# Patient Record
Sex: FEMALE | Race: WHITE | Employment: STUDENT | URBAN - NONMETROPOLITAN AREA
[De-identification: names, ages, dates, MRNs, and addresses within clinical notes are randomized per-mention and may not be internally consistent; named-entity substitution may affect disease eponyms.]

---
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
## 2024-01-13 NOTE — PROGRESS NOTES
C Section Postpartum Progress Note    Subjective:      24 y.o.   @ 39w4d    Postpartum Day 1:  Delivery for Non reassuraing fetal heart rate tracing    The patient feels well. The patient denies emotional concerns. Pain is well controlled with current medications. The baby iswell. Baby is feeding via breast. Urinary output is adequate. The patient is ambulating well. The patient is tolerating a normal diet. Flatus denies been passed. Objective:      Patient Vitals for the past 8 hrs:   BP Temp Temp src Pulse Resp   22 1608 134/79 98 °F (36.7 °C) Oral 77 17     General:    alert, appears stated age, and cooperative   Bowel Sounds:  active   Lochia:  appropriate   Uterine Fundus:   firm   Incision:  healing well, no significant drainage, no dehiscence, no significant erythema   DVT Evaluation:  No evidence of DVT seen on physical exam.         Assessment:     Status post  section. Doing well postoperatively. Plan: 1. Routine post op care    Continue current care. Mai Apgar.  Dodie Jarquin MBA, Larina Fee  2022 7:39 PM Physical Therapy: Orders received. Chart reviewed and discussed with care team.? Physical Therapy not indicated due to patient ambulating well, independent in room, appropriate for 1 discipline, occupational therapy following patient.? Defer discharge recommendations to occupational therapy.? Will complete orders. Please re-consult if further needs arise.

## 2024-02-02 ENCOUNTER — OFFICE VISIT (OUTPATIENT)
Dept: OBGYN CLINIC | Age: 19
End: 2024-02-02
Payer: COMMERCIAL

## 2024-02-02 VITALS
DIASTOLIC BLOOD PRESSURE: 70 MMHG | WEIGHT: 200 LBS | BODY MASS INDEX: 39.06 KG/M2 | HEART RATE: 97 BPM | SYSTOLIC BLOOD PRESSURE: 114 MMHG

## 2024-02-02 DIAGNOSIS — Z97.5 BREAKTHROUGH BLEEDING ON NEXPLANON: ICD-10-CM

## 2024-02-02 DIAGNOSIS — N94.6 DYSMENORRHEA: Primary | ICD-10-CM

## 2024-02-02 DIAGNOSIS — N92.1 BREAKTHROUGH BLEEDING ON NEXPLANON: ICD-10-CM

## 2024-02-02 PROCEDURE — 99214 OFFICE O/P EST MOD 30 MIN: CPT | Performed by: ADVANCED PRACTICE MIDWIFE

## 2024-02-02 RX ORDER — ETONOGESTREL 68 MG/1
68 IMPLANT SUBCUTANEOUS ONCE
COMMUNITY
Start: 2023-11-24 | End: 2026-11-24

## 2024-02-02 RX ORDER — AMOXICILLIN AND CLAVULANATE POTASSIUM 875; 125 MG/1; MG/1
1 TABLET, FILM COATED ORAL 2 TIMES DAILY
COMMUNITY
Start: 2024-01-31

## 2024-02-02 ASSESSMENT — ENCOUNTER SYMPTOMS
NAUSEA: 0
CONSTIPATION: 0
COUGH: 0
VOMITING: 0
DIARRHEA: 0
ABDOMINAL PAIN: 0
SHORTNESS OF BREATH: 0

## 2024-02-02 NOTE — PROGRESS NOTES
SUBJECTIVE:  Barbi Willson is a 18 y.o. female who presents here today for complaints of:      Chief Complaint   Patient presents with    Vaginal Bleeding     Pt has been having irregular bleeding since getting nexplanon, she has on and off some days but she is for the most part. She has tried taking ibuprofen 800 mg every 8 hours but has not had anymore improvement      Dysmenorrhea, Bleeding with Nexplanon  Utilizing hormonal contraception to relieve uncomfortable menstrual symptoms.    Dysmenorrhea symptoms have been occurring for greater than 1 year.  Nexplanon placed 11/24/23.  Following insertion she did not have any adverse side effects.  Now with breakthrough bleeding that started about 3 weeks ago and is becoming bothersome.  Overall she is still happy with her Nexplanon, but only if the bleeding improves.    Review of Systems   Respiratory:  Negative for cough and shortness of breath.    Gastrointestinal:  Negative for abdominal pain, constipation, diarrhea, nausea and vomiting.   Genitourinary:  Positive for menstrual problem. Negative for difficulty urinating, dysuria, pelvic pain, vaginal bleeding and vaginal discharge.   All other systems reviewed and are negative.    OBJECTIVE:  Vitals:  /70   Pulse 97   Wt 90.7 kg (200 lb)   BMI 39.06 kg/m²     Physical Exam  Appearance:  Normal appearance  Cardiovascular:  Normal rate, Capillary refill less than 2 seconds  Pulmonary:  Normal effort, no distress  Abdominal:  No tenderness  MS:  No Swelling, No dependent edema  Skin:  Warm, dry  Neuro:  Alert and oriented x3, reflexes normal.  Psychiatric:  Normal mood and behavior    ASSESSMENT & PLAN:   Diagnosis Orders   1. Dysmenorrhea        2. Breakthrough bleeding on Nexplanon  norethindrone-ethinyl estradiol-Fe (LO LOESTRIN FE) 1 MG-10 MCG / 10 MCG tablet          Dysmenorrhea  Utilizing hormonal contraception to relieve uncomfortable menstrual symptoms.    Breakthrough Bleeding with 
40.2

## 2024-04-26 ENCOUNTER — PROCEDURE VISIT (OUTPATIENT)
Dept: OBGYN CLINIC | Age: 19
End: 2024-04-26

## 2024-04-26 VITALS
BODY MASS INDEX: 40.44 KG/M2 | HEART RATE: 104 BPM | SYSTOLIC BLOOD PRESSURE: 120 MMHG | WEIGHT: 206 LBS | DIASTOLIC BLOOD PRESSURE: 60 MMHG | HEIGHT: 60 IN

## 2024-04-26 DIAGNOSIS — Z76.89 ENCOUNTER FOR WEIGHT MANAGEMENT: ICD-10-CM

## 2024-04-26 DIAGNOSIS — Z30.46 ENCOUNTER FOR REMOVAL OF SUBDERMAL CONTRACEPTIVE IMPLANT: Primary | ICD-10-CM

## 2024-04-26 DIAGNOSIS — Z86.39 HISTORY OF OBESITY: ICD-10-CM

## 2024-04-26 RX ORDER — PHENTERMINE HYDROCHLORIDE 37.5 MG/1
37.5 TABLET ORAL
Qty: 30 TABLET | Refills: 0 | Status: SHIPPED | OUTPATIENT
Start: 2024-04-26 | End: 2024-05-26

## 2024-04-26 SDOH — ECONOMIC STABILITY: FOOD INSECURITY: WITHIN THE PAST 12 MONTHS, YOU WORRIED THAT YOUR FOOD WOULD RUN OUT BEFORE YOU GOT MONEY TO BUY MORE.: NEVER TRUE

## 2024-04-26 SDOH — ECONOMIC STABILITY: FOOD INSECURITY: WITHIN THE PAST 12 MONTHS, THE FOOD YOU BOUGHT JUST DIDN'T LAST AND YOU DIDN'T HAVE MONEY TO GET MORE.: NEVER TRUE

## 2024-04-26 SDOH — ECONOMIC STABILITY: INCOME INSECURITY: HOW HARD IS IT FOR YOU TO PAY FOR THE VERY BASICS LIKE FOOD, HOUSING, MEDICAL CARE, AND HEATING?: NOT HARD AT ALL

## 2024-04-26 ASSESSMENT — PATIENT HEALTH QUESTIONNAIRE - PHQ9
6. FEELING BAD ABOUT YOURSELF - OR THAT YOU ARE A FAILURE OR HAVE LET YOURSELF OR YOUR FAMILY DOWN: NOT AT ALL
3. TROUBLE FALLING OR STAYING ASLEEP: NOT AT ALL
2. FEELING DOWN, DEPRESSED OR HOPELESS: NOT AT ALL
10. IF YOU CHECKED OFF ANY PROBLEMS, HOW DIFFICULT HAVE THESE PROBLEMS MADE IT FOR YOU TO DO YOUR WORK, TAKE CARE OF THINGS AT HOME, OR GET ALONG WITH OTHER PEOPLE: NOT DIFFICULT AT ALL
4. FEELING TIRED OR HAVING LITTLE ENERGY: NOT AT ALL
SUM OF ALL RESPONSES TO PHQ QUESTIONS 1-9: 1
9. THOUGHTS THAT YOU WOULD BE BETTER OFF DEAD, OR OF HURTING YOURSELF: NOT AT ALL
SUM OF ALL RESPONSES TO PHQ QUESTIONS 1-9: 1
5. POOR APPETITE OR OVEREATING: NOT AT ALL
SUM OF ALL RESPONSES TO PHQ QUESTIONS 1-9: 1
8. MOVING OR SPEAKING SO SLOWLY THAT OTHER PEOPLE COULD HAVE NOTICED. OR THE OPPOSITE, BEING SO FIGETY OR RESTLESS THAT YOU HAVE BEEN MOVING AROUND A LOT MORE THAN USUAL: NOT AT ALL
SUM OF ALL RESPONSES TO PHQ9 QUESTIONS 1 & 2: 0
7. TROUBLE CONCENTRATING ON THINGS, SUCH AS READING THE NEWSPAPER OR WATCHING TELEVISION: SEVERAL DAYS
SUM OF ALL RESPONSES TO PHQ QUESTIONS 1-9: 1
1. LITTLE INTEREST OR PLEASURE IN DOING THINGS: NOT AT ALL

## 2024-04-26 ASSESSMENT — ENCOUNTER SYMPTOMS
NAUSEA: 0
COUGH: 0
CONSTIPATION: 0
ABDOMINAL PAIN: 0
SHORTNESS OF BREATH: 0
VOMITING: 0
DIARRHEA: 0

## 2024-04-26 NOTE — PROGRESS NOTES
Nexplanon Removal Procedure Note    Indications:  [facility]     Pre-operative Diagnosis:  Same    Post-operative Diagnosis: Same    Procedure Details:   Procedure risks, complications, activity restrictions following procedure, possible adverse effects, and warning/danger signs to report immediately were all discussed in detail and patient participation/questions were encouraged.  Following this discussion, informed consent was obtained.      A time out was performed with patient participation.  The patient was then positioned comfortably on the procedure table.  The implant was located and palpated in the left upper arm.  A sterile prep and drape was completed and 1% lidocaine with epinephrine was administered for local anesthetia.  A small incision was made into the skin just beyond the proximal tip of the implant.  Upon visualization of the implant, it was grasped, removed intact, and shown to the patient for visual confirmation of its complete and intact removal.    Condition:  Stable    Complications:  None    Plan:  The patient was advised to call for any fever or for prolonged or severe pain or bleeding. She was advised to use OTC ibuprofen or Tylenol as needed for mild to moderate pain.

## 2024-04-26 NOTE — PROGRESS NOTES
SUBJECTIVE:  Barbi Willson is a 19 y.o. female who presents here today for complaints of:      No chief complaint on file.    Encounter for Weight Loss Counseling, History of Obesity  Reports a history of chronic obesity despite efforts to improve diet, increase exercise, and improve lifestyle.  Requesting to start Adipex.  Past medical history was reviewed and updated as needed  Denies a history of or any known current conditions that would contraindicate the use of Adipex.  Adipex is a controlled substance and has the potential to become addictive.  It should not be taken if you have any history or tendency towards alcohol or drug abuse.  Possible medication side effects include fatigue/insomnia, constipation, anxiousness, palpitations, headache, dry mouth.  If adverse side effects occur, the medication must be discontinued.  Adipex is a stimulant, so it is best to take it in the morning  Discussed the importance of diet (encouraged low carb diet) and exercising at least 30 minutes 4-5x/week.      Review of Systems   Respiratory:  Negative for cough and shortness of breath.    Gastrointestinal:  Negative for abdominal pain, constipation, diarrhea, nausea and vomiting.   Genitourinary:  Negative for difficulty urinating, dysuria, menstrual problem, pelvic pain, vaginal bleeding and vaginal discharge.   All other systems reviewed and are negative.    OBJECTIVE:  Vitals:  /60 (Site: Left Upper Arm)   Pulse (!) 104   Ht 1.524 m (5')   Wt 93.4 kg (206 lb)   BMI 40.23 kg/m²     Physical Exam  Appearance:  Normal appearance  Cardiovascular:  Normal rate, Capillary refill less than 2 seconds  Pulmonary:  Normal effort, no distress  Abdominal:  No tenderness  MS:  No Swelling, No dependent edema  Skin:  Warm, dry  Neuro:  Alert and oriented x3, reflexes normal.  Psychiatric:  Normal mood and behavior    ASSESSMENT & PLAN:   Diagnosis Orders   1. Encounter for removal of subdermal contraceptive implant        2.

## 2024-05-09 ENCOUNTER — HOSPITAL ENCOUNTER (OUTPATIENT)
Dept: CARDIOLOGY | Facility: HOSPITAL | Age: 19
Discharge: HOME | End: 2024-05-09
Payer: MEDICAID

## 2024-05-09 ENCOUNTER — APPOINTMENT (OUTPATIENT)
Dept: RADIOLOGY | Facility: HOSPITAL | Age: 19
End: 2024-05-09
Payer: MEDICAID

## 2024-05-09 ENCOUNTER — HOSPITAL ENCOUNTER (EMERGENCY)
Facility: HOSPITAL | Age: 19
Discharge: HOME | End: 2024-05-09
Attending: EMERGENCY MEDICINE
Payer: MEDICAID

## 2024-05-09 VITALS
SYSTOLIC BLOOD PRESSURE: 115 MMHG | TEMPERATURE: 98.1 F | WEIGHT: 198 LBS | HEART RATE: 84 BPM | DIASTOLIC BLOOD PRESSURE: 75 MMHG | HEIGHT: 64 IN | OXYGEN SATURATION: 99 % | BODY MASS INDEX: 33.8 KG/M2 | RESPIRATION RATE: 19 BRPM

## 2024-05-09 DIAGNOSIS — S06.0X0A CONCUSSION WITHOUT LOSS OF CONSCIOUSNESS, INITIAL ENCOUNTER: ICD-10-CM

## 2024-05-09 DIAGNOSIS — R51.9 FACIAL PAIN: ICD-10-CM

## 2024-05-09 DIAGNOSIS — S09.90XA CLOSED HEAD INJURY, INITIAL ENCOUNTER: Primary | ICD-10-CM
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
## 2024-05-09 LAB
ABO GROUP (TYPE) IN BLOOD: NORMAL
ALBUMIN SERPL BCP-MCNC: 4.9 G/DL (ref 3.4–5)
ALP SERPL-CCNC: 77 U/L (ref 33–110)
ALT SERPL W P-5'-P-CCNC: 13 U/L (ref 7–45)
ANION GAP SERPL CALC-SCNC: 11 MMOL/L (ref 10–20)
ANTIBODY SCREEN: NORMAL
AST SERPL W P-5'-P-CCNC: 14 U/L (ref 9–39)
B-HCG SERPL-ACNC: <2 MIU/ML
BASOPHILS # BLD AUTO: 0.02 X10*3/UL (ref 0–0.1)
BASOPHILS NFR BLD AUTO: 0.3 %
BILIRUB SERPL-MCNC: 1.4 MG/DL (ref 0–1.2)
BUN SERPL-MCNC: 7 MG/DL (ref 6–23)
CALCIUM SERPL-MCNC: 9.7 MG/DL (ref 8.6–10.3)
CHLORIDE SERPL-SCNC: 102 MMOL/L (ref 98–107)
CO2 SERPL-SCNC: 27 MMOL/L (ref 21–32)
CREAT SERPL-MCNC: 0.79 MG/DL (ref 0.5–1.05)
EGFRCR SERPLBLD CKD-EPI 2021: >90 ML/MIN/1.73M*2
EOSINOPHIL # BLD AUTO: 0.06 X10*3/UL (ref 0–0.7)
EOSINOPHIL NFR BLD AUTO: 0.8 %
ERYTHROCYTE [DISTWIDTH] IN BLOOD BY AUTOMATED COUNT: 13 % (ref 11.5–14.5)
GLUCOSE SERPL-MCNC: 95 MG/DL (ref 74–99)
HCT VFR BLD AUTO: 42.6 % (ref 36–46)
HGB BLD-MCNC: 13.9 G/DL (ref 12–16)
IMM GRANULOCYTES # BLD AUTO: 0.02 X10*3/UL (ref 0–0.7)
IMM GRANULOCYTES NFR BLD AUTO: 0.3 % (ref 0–0.9)
LYMPHOCYTES # BLD AUTO: 1.49 X10*3/UL (ref 1.2–4.8)
LYMPHOCYTES NFR BLD AUTO: 19 %
MCH RBC QN AUTO: 27.8 PG (ref 26–34)
MCHC RBC AUTO-ENTMCNC: 32.6 G/DL (ref 32–36)
MCV RBC AUTO: 85 FL (ref 80–100)
MONOCYTES # BLD AUTO: 0.66 X10*3/UL (ref 0.1–1)
MONOCYTES NFR BLD AUTO: 8.4 %
NEUTROPHILS # BLD AUTO: 5.58 X10*3/UL (ref 1.2–7.7)
NEUTROPHILS NFR BLD AUTO: 71.2 %
NRBC BLD-RTO: 0 /100 WBCS (ref 0–0)
PLATELET # BLD AUTO: 319 X10*3/UL (ref 150–450)
POTASSIUM SERPL-SCNC: 3.4 MMOL/L (ref 3.5–5.3)
PROT SERPL-MCNC: 7.5 G/DL (ref 6.4–8.2)
RBC # BLD AUTO: 5 X10*6/UL (ref 4–5.2)
RH FACTOR (ANTIGEN D): NORMAL
SODIUM SERPL-SCNC: 137 MMOL/L (ref 136–145)
WBC # BLD AUTO: 7.8 X10*3/UL (ref 4.4–11.3)

## 2024-05-09 PROCEDURE — 99285 EMERGENCY DEPT VISIT HI MDM: CPT | Mod: 25

## 2024-05-09 PROCEDURE — 86901 BLOOD TYPING SEROLOGIC RH(D): CPT | Performed by: EMERGENCY MEDICINE

## 2024-05-09 PROCEDURE — 76376 3D RENDER W/INTRP POSTPROCES: CPT | Performed by: RADIOLOGY

## 2024-05-09 PROCEDURE — 70450 CT HEAD/BRAIN W/O DYE: CPT | Performed by: RADIOLOGY

## 2024-05-09 PROCEDURE — 70486 CT MAXILLOFACIAL W/O DYE: CPT

## 2024-05-09 PROCEDURE — 76377 3D RENDER W/INTRP POSTPROCES: CPT

## 2024-05-09 PROCEDURE — 70486 CT MAXILLOFACIAL W/O DYE: CPT | Performed by: RADIOLOGY

## 2024-05-09 PROCEDURE — 2500000004 HC RX 250 GENERAL PHARMACY W/ HCPCS (ALT 636 FOR OP/ED): Performed by: EMERGENCY MEDICINE

## 2024-05-09 PROCEDURE — 80053 COMPREHEN METABOLIC PANEL: CPT | Performed by: EMERGENCY MEDICINE

## 2024-05-09 PROCEDURE — 71045 X-RAY EXAM CHEST 1 VIEW: CPT

## 2024-05-09 PROCEDURE — 85025 COMPLETE CBC W/AUTO DIFF WBC: CPT | Performed by: EMERGENCY MEDICINE

## 2024-05-09 PROCEDURE — 84702 CHORIONIC GONADOTROPIN TEST: CPT | Performed by: EMERGENCY MEDICINE

## 2024-05-09 PROCEDURE — 99285 EMERGENCY DEPT VISIT HI MDM: CPT | Performed by: EMERGENCY MEDICINE

## 2024-05-09 PROCEDURE — 93005 ELECTROCARDIOGRAM TRACING: CPT

## 2024-05-09 PROCEDURE — 96375 TX/PRO/DX INJ NEW DRUG ADDON: CPT

## 2024-05-09 PROCEDURE — 96374 THER/PROPH/DIAG INJ IV PUSH: CPT

## 2024-05-09 PROCEDURE — 36415 COLL VENOUS BLD VENIPUNCTURE: CPT | Performed by: EMERGENCY MEDICINE

## 2024-05-09 PROCEDURE — 70450 CT HEAD/BRAIN W/O DYE: CPT

## 2024-05-09 PROCEDURE — 71045 X-RAY EXAM CHEST 1 VIEW: CPT | Performed by: RADIOLOGY

## 2024-05-09 RX ORDER — IBUPROFEN 400 MG/1
400 TABLET ORAL ONCE
Status: DISCONTINUED | OUTPATIENT
Start: 2024-05-09 | End: 2024-05-09

## 2024-05-09 RX ORDER — ONDANSETRON HYDROCHLORIDE 2 MG/ML
4 INJECTION, SOLUTION INTRAVENOUS ONCE
Status: COMPLETED | OUTPATIENT
Start: 2024-05-09 | End: 2024-05-09

## 2024-05-09 RX ORDER — ONDANSETRON 4 MG/1
4 TABLET, ORALLY DISINTEGRATING ORAL EVERY 8 HOURS PRN
Qty: 20 TABLET | Refills: 0 | Status: SHIPPED | OUTPATIENT
Start: 2024-05-09 | End: 2024-05-16

## 2024-05-09 RX ORDER — ACETAMINOPHEN 325 MG/1
975 TABLET ORAL ONCE
Status: COMPLETED | OUTPATIENT
Start: 2024-05-09 | End: 2024-05-09

## 2024-05-09 RX ORDER — KETOROLAC TROMETHAMINE 15 MG/ML
15 INJECTION, SOLUTION INTRAMUSCULAR; INTRAVENOUS ONCE
Status: COMPLETED | OUTPATIENT
Start: 2024-05-09 | End: 2024-05-09

## 2024-05-09 RX ORDER — METOCLOPRAMIDE HYDROCHLORIDE 5 MG/ML
10 INJECTION INTRAMUSCULAR; INTRAVENOUS ONCE
Status: DISCONTINUED | OUTPATIENT
Start: 2024-05-09 | End: 2024-05-09

## 2024-05-09 RX ORDER — POTASSIUM CHLORIDE 1.5 G/1.58G
40 POWDER, FOR SOLUTION ORAL ONCE
Status: DISCONTINUED | OUTPATIENT
Start: 2024-05-09 | End: 2024-05-09

## 2024-05-09 RX ORDER — PROCHLORPERAZINE EDISYLATE 5 MG/ML
10 INJECTION INTRAMUSCULAR; INTRAVENOUS ONCE
Status: DISCONTINUED | OUTPATIENT
Start: 2024-05-09 | End: 2024-05-09

## 2024-05-09 RX ADMIN — ACETAMINOPHEN 975 MG: 325 TABLET ORAL at 14:52

## 2024-05-09 RX ADMIN — KETOROLAC TROMETHAMINE 15 MG: 15 INJECTION, SOLUTION INTRAMUSCULAR; INTRAVENOUS at 15:34

## 2024-05-09 RX ADMIN — ONDANSETRON 4 MG: 2 INJECTION INTRAMUSCULAR; INTRAVENOUS at 14:28

## 2024-05-09 ASSESSMENT — PAIN SCALES - GENERAL
PAINLEVEL_OUTOF10: 6
PAINLEVEL_OUTOF10: 8
PAINLEVEL_OUTOF10: 5 - MODERATE PAIN

## 2024-05-09 ASSESSMENT — COLUMBIA-SUICIDE SEVERITY RATING SCALE - C-SSRS
1. IN THE PAST MONTH, HAVE YOU WISHED YOU WERE DEAD OR WISHED YOU COULD GO TO SLEEP AND NOT WAKE UP?: NO
6. HAVE YOU EVER DONE ANYTHING, STARTED TO DO ANYTHING, OR PREPARED TO DO ANYTHING TO END YOUR LIFE?: NO
2. HAVE YOU ACTUALLY HAD ANY THOUGHTS OF KILLING YOURSELF?: NO

## 2024-05-09 ASSESSMENT — PAIN DESCRIPTION - LOCATION: LOCATION: FACE

## 2024-05-09 ASSESSMENT — LIFESTYLE VARIABLES
EVER FELT BAD OR GUILTY ABOUT YOUR DRINKING: NO
HAVE PEOPLE ANNOYED YOU BY CRITICIZING YOUR DRINKING: NO
TOTAL SCORE: 0
EVER HAD A DRINK FIRST THING IN THE MORNING TO STEADY YOUR NERVES TO GET RID OF A HANGOVER: NO
HAVE YOU EVER FELT YOU SHOULD CUT DOWN ON YOUR DRINKING: NO

## 2024-05-09 ASSESSMENT — PAIN - FUNCTIONAL ASSESSMENT
PAIN_FUNCTIONAL_ASSESSMENT: 0-10

## 2024-05-09 NOTE — Clinical Note
Steph Lewis was seen and treated in our emergency department on 5/9/2024.  She may return to school on 05/13/2024.      If you have any questions or concerns, please don't hesitate to call.      Chu Patel MD DATE OF SERVICE: 01-19-24 @ 12:18    CHIEF COMPLAINT:Patient is a 53y old  Male who presents with a chief complaint of shortness of breath.    HISTORY OF PRESENT ILLNESS: 53-year-old male past medical history of hypertension hypothyroidism non-Hodgkin's lymphoma and testicular cancer in remission melanoma of the scalp status postresection presenting from home after an abnormal nuclear stress test yesterday and Dr. Marks's office.  Reports that he has been having dyspnea on exertion and fatigue for weeks symptoms have been occurring intermittently.  This is what prompted the stress test yesterday.  States that last night he developed intermittent chest pain substernal aching lasting minutes to hours at a time not actively experiencing pain.  EKG done in triage is nonischemic.  Reports that he is here to see Dr. Garrison for cath.  No active chest pain, shortness of breath, leg swelling, syncope, abdominal pain, nausea, vomiting.      PAST MEDICAL & SURGICAL HISTORY:  Testicular Cancer  1994, chemotherapy      Headache, Migraine      HTN (hypertension)      NHL (non-Hodgkin's lymphoma)  1999, Radiation to left neck region      GERD (gastroesophageal reflux disease)      Colitis  surgery 1996      BPH (benign prostatic hyperplasia)      Osteoarthritis  degenerative disc L3-4      History of bone marrow transplant      Hypertriglyceridemia      Malignant melanoma of scalp  RSX 08/18  R neck mass dsx/ LN dsx 10/19/18      Hypothyroidism      History of chemotherapy      History of Raynaud's syndrome      History of orchiectomy  left 1994      S/P colon resection  1996      Lymph node disorder  s/p abdominal lymph node dissection 1994, 1996      Melanoma of scalp or neck  excision 8/18  s/p excision right neck mass & LN dissx      History of nasal septoplasty      History of infusaport central venous catheter insertion      History of infusaport central venous catheter removal              MEDICATIONS:                  FAMILY HISTORY:  Hypertension (Father)        Non-contributory    SOCIAL HISTORY:    [- ] Tobacco former remote smoking  [- ] Drugs  [- ] Alcohol    Allergies    No Known Allergies    Intolerances    	    REVIEW OF SYSTEMS:  CONSTITUTIONAL: No fever  EYES: No eye pain, visual disturbances, or discharge  ENMT:  No difficulty hearing, tinnitus  NECK: No pain or stiffness  RESPIRATORY: No cough, wheezing, + SOB  CARDIOVASCULAR: No chest pain, palpitations, passing out, dizziness, or leg swelling  GASTROINTESTINAL:  No nausea, vomiting, diarrhea or constipation. No melena.  GENITOURINARY: No dysuria, hematuria  NEUROLOGICAL: No stroke like symptoms  SKIN: No burning or lesions   ENDOCRINE: No heat or cold intolerance  MUSCULOSKELETAL: No joint pain or swelling  PSYCHIATRIC: No  anxiety, mood swings  HEME/LYMPH: No bleeding gums  ALLERGY AND IMMUNOLOGIC: No hives or eczema	    All other ROS negative    PHYSICAL EXAM:  T(C): 36.4 (01-19-24 @ 07:42), Max: 36.4 (01-19-24 @ 07:42)  HR: 92 (01-19-24 @ 07:42) (92 - 92)  BP: 102/70 (01-19-24 @ 07:42) (102/70 - 102/70)  RR: 20 (01-19-24 @ 07:42) (20 - 20)  SpO2: 100% (01-19-24 @ 07:42) (100% - 100%)  Wt(kg): --  I&O's Summary      Appearance: Normal	  HEENT:   Normal oral mucosa, EOMI	  Cardiovascular:  S1 S2, No JVD,    Respiratory: Lungs clear to auscultation	  Psychiatry: Alert  Gastrointestinal:  Soft, Non-tender, + BS	  Skin: No rashes   Neurologic: Non-focal  Extremities:  No edema  Vascular: Peripheral pulses palpable    	    	  	  CARDIAC MARKERS:  Labs personally reviewed by me                                  12.3   5.83  )-----------( 202      ( 19 Jan 2024 08:57 )             37.9     01-19    137  |  101  |  14  ----------------------------<  89  4.0   |  27  |  0.85    Ca    9.8      19 Jan 2024 08:57    TPro  6.8  /  Alb  4.2  /  TBili  0.7  /  DBili  x   /  AST  27  /  ALT  15  /  AlkPhos  70  01-19          EKG: Personally reviewed by me - SR with 1st AVB   Radiology: Personally reviewed by me -   < from: Xray Chest 1 View- PORTABLE-Urgent (01.19.24 @ 09:41) >  The heart size is normal.  The lungs are clear.  No pneumothorax or pleural effusion.    IMPRESSION:  Clear lungs.    < end of copied text >  < from: Transthoracic Echocardiogram (06.01.20 @ 17:43) >  Conclusions:  1. Normal left ventricular internal dimensions and wall  thicknesses.  2. Endocardiumnot well visualized; grossly normal left  ventricular systolic function. Regional wall motion could  not be accurately assessed. Consider repeat limited study  with definity contrast if clinically indicated.  3. Normal right ventricular size and function.  4. Estimated right ventricular systolic pressure equals 13  mm Hg, assuming right atrial pressure equals 8 mm Hg,  consistent with normal pulmonary pressures.  *** No previous Echo exam.    < end of copied text >  < from: Cardiac Cath Lab - Adult (08.21.20 @ 07:37) >  CORONARY VESSELS: The coronary circulation is right dominant.  LM:   -- LM: Normal.  LAD:   --  LAD: Normal.  CX:   --  Circumflex: Normal.  RCA:   --  RCA: Normal.    < end of copied text >  < from: CT Neck Soft Tissue w/ IV Cont (11.11.23 @ 16:55) >    At RIGHT level 2 excisional node biopsy site, gas-containing fluid   collection with rim enhancement dissects anteriorly to involve the   submandibular space. In total, collections spans approximately 7 cm max   AP dimension. This is most consistent with ABSCESS, with surrounding   cellulitis and central edema along pharyngeal wall.    At LEFT level 2 site, smaller gas-containing cavity without similar   rim-enhancement to suggest abscess.        Assessment /Plan:     53-year-old male past medical history of hypertension hypothyroidism non-Hodgkin's lymphoma and testicular cancer in remission melanoma of the scalp status postresection presenting from home after an abnormal nuclear stress test yesterday and Dr. Marks's office.  Reports that he has been having dyspnea on exertion and fatigue for weeks symptoms have been occurring intermittently. States that last night he developed intermittent chest pain substernal aching lasting minutes to hours at a time not actively experiencing pain. No active chest pain, shortness of breath, leg swelling, syncope, abdominal pain, nausea, vomiting.     1. Chest Pain  - ECG NSR 1st AVB with no ischemia noted  - Trop negative  - TTE as OP in 12/23 with preserved EF, no WMA and no significant valvular dysfunction  - NST 1/18 abnormal finding moderate to large area of anterolateral ischemia, mod-severe severity.  - Start ASA 81mg PO daily, atorvastatin 40mg PO daily  - Plan for cardiac cath today    2. Hypertension  - BP currently stable off anti-hypertensives    3. HLD  - Check lipid profile  - c/w atorvastatin 40mg PO daily    4. Chronic Pain  - 2/2 immunotherapy side effects  - Resume home pain medication regimen       Differential diagnosis and plan of care discussed with patient after the evaluation. Counseling on diet, nutritional counseling, weight management, exercise and medication compliance was done.   Advanced care planning/advanced directives discussed with patient/family. DNR status including forceful chest compressions to attempt to restart the heart, ventilator support/artificial breathing, electric shock, artificial nutrition, health care proxy, Molst form all discussed with pt. Pt wishes to consider. More than fifteen minutes spent on discussing advanced directives.     Nuvia Ritchie [facility]  Vic Carballo DO [facility]  Cardiovascular Medicine  [address] [place] [address] Dr, [address]  Available for call or text via [employer]  Office [phone]   DATE OF SERVICE: 01-19-24 @ 12:18    CHIEF COMPLAINT:Patient is a 53y old  Male who presents with a chief complaint of shortness of breath.    HISTORY OF PRESENT ILLNESS: 53-year-old male past medical history of hypertension hypothyroidism non-Hodgkin's lymphoma and testicular cancer in remission melanoma of the scalp status postresection presenting from home after an abnormal nuclear stress test yesterday and Dr. Marks's office.  Reports that he has been having dyspnea on exertion and fatigue for weeks symptoms have been occurring intermittently.  This is what prompted the stress test yesterday.  States that last night he developed intermittent chest pain substernal aching lasting minutes to hours at a time not actively experiencing pain.  EKG done in triage is nonischemic.  Reports that he is here to see Dr. Garrison for cath.  No active chest pain, shortness of breath, leg swelling, syncope, abdominal pain, nausea, vomiting.      PAST MEDICAL & SURGICAL HISTORY:  Testicular Cancer  1994, chemotherapy      Headache, Migraine      HTN (hypertension)      NHL (non-Hodgkin's lymphoma)  1999, Radiation to left neck region      GERD (gastroesophageal reflux disease)      Colitis  surgery 1996      BPH (benign prostatic hyperplasia)      Osteoarthritis  degenerative disc L3-4      History of bone marrow transplant      Hypertriglyceridemia      Malignant melanoma of scalp  RSX 08/18  R neck mass dsx/ LN dsx 10/19/18      Hypothyroidism      History of chemotherapy      History of Raynaud's syndrome      History of orchiectomy  left 1994      S/P colon resection  1996      Lymph node disorder  s/p abdominal lymph node dissection 1994, 1996      Melanoma of scalp or neck  excision 8/18  s/p excision right neck mass & LN dissx      History of nasal septoplasty      History of infusaport central venous catheter insertion      History of infusaport central venous catheter removal              MEDICATIONS:                  FAMILY HISTORY:  Hypertension (Father)        Non-contributory    SOCIAL HISTORY:    [- ] Tobacco former remote smoking  [- ] Drugs  [- ] Alcohol    Allergies    No Known Allergies    Intolerances    	    REVIEW OF SYSTEMS:  CONSTITUTIONAL: No fever  EYES: No eye pain, visual disturbances, or discharge  ENMT:  No difficulty hearing, tinnitus  NECK: No pain or stiffness  RESPIRATORY: No cough, wheezing, + SOB  CARDIOVASCULAR: No chest pain, palpitations, passing out, dizziness, or leg swelling  GASTROINTESTINAL:  No nausea, vomiting, diarrhea or constipation. No melena.  GENITOURINARY: No dysuria, hematuria  NEUROLOGICAL: No stroke like symptoms  SKIN: No burning or lesions   ENDOCRINE: No heat or cold intolerance  MUSCULOSKELETAL: No joint pain or swelling  PSYCHIATRIC: No  anxiety, mood swings  HEME/LYMPH: No bleeding gums  ALLERGY AND IMMUNOLOGIC: No hives or eczema	    All other ROS negative    PHYSICAL EXAM:  T(C): 36.4 (01-19-24 @ 07:42), Max: 36.4 (01-19-24 @ 07:42)  HR: 92 (01-19-24 @ 07:42) (92 - 92)  BP: 102/70 (01-19-24 @ 07:42) (102/70 - 102/70)  RR: 20 (01-19-24 @ 07:42) (20 - 20)  SpO2: 100% (01-19-24 @ 07:42) (100% - 100%)  Wt(kg): --  I&O's Summary      Appearance: Normal	  HEENT:   Normal oral mucosa, EOMI	  Cardiovascular:  S1 S2, No JVD,    Respiratory: Lungs clear to auscultation	  Psychiatry: Alert  Gastrointestinal:  Soft, Non-tender, + BS	  Skin: No rashes   Neurologic: Non-focal  Extremities:  No edema  Vascular: Peripheral pulses palpable    	    	  	  CARDIAC MARKERS:  Labs personally reviewed by me                                  12.3   5.83  )-----------( 202      ( 19 Jan 2024 08:57 )             37.9     01-19    137  |  101  |  14  ----------------------------<  89  4.0   |  27  |  0.85    Ca    9.8      19 Jan 2024 08:57    TPro  6.8  /  Alb  4.2  /  TBili  0.7  /  DBili  x   /  AST  27  /  ALT  15  /  AlkPhos  70  01-19          EKG: Personally reviewed by me - SR with 1st AVB   Radiology: Personally reviewed by me -   < from: Xray Chest 1 View- PORTABLE-Urgent (01.19.24 @ 09:41) >  The heart size is normal.  The lungs are clear.  No pneumothorax or pleural effusion.    IMPRESSION:  Clear lungs.    < end of copied text >  < from: Transthoracic Echocardiogram (06.01.20 @ 17:43) >  Conclusions:  1. Normal left ventricular internal dimensions and wall  thicknesses.  2. Endocardiumnot well visualized; grossly normal left  ventricular systolic function. Regional wall motion could  not be accurately assessed. Consider repeat limited study  with definity contrast if clinically indicated.  3. Normal right ventricular size and function.  4. Estimated right ventricular systolic pressure equals 13  mm Hg, assuming right atrial pressure equals 8 mm Hg,  consistent with normal pulmonary pressures.  *** No previous Echo exam.    < end of copied text >  < from: Cardiac Cath Lab - Adult (08.21.20 @ 07:37) >  CORONARY VESSELS: The coronary circulation is right dominant.  LM:   -- LM: Normal.  LAD:   --  LAD: Normal.  CX:   --  Circumflex: Normal.  RCA:   --  RCA: Normal.    < end of copied text >  < from: CT Neck Soft Tissue w/ IV Cont (11.11.23 @ 16:55) >    At RIGHT level 2 excisional node biopsy site, gas-containing fluid   collection with rim enhancement dissects anteriorly to involve the   submandibular space. In total, collections spans approximately 7 cm max   AP dimension. This is most consistent with ABSCESS, with surrounding   cellulitis and central edema along pharyngeal wall.    At LEFT level 2 site, smaller gas-containing cavity without similar   rim-enhancement to suggest abscess.        Assessment /Plan:     53-year-old male past medical history of hypertension hypothyroidism non-Hodgkin's lymphoma and testicular cancer in remission melanoma of the scalp status postresection presenting from home after an abnormal nuclear stress test yesterday and Dr. Marks's office.  Reports that he has been having dyspnea on exertion and fatigue for weeks symptoms have been occurring intermittently. States that last night he developed intermittent chest pain substernal aching lasting minutes to hours at a time not actively experiencing pain. No active chest pain, shortness of breath, leg swelling, syncope, abdominal pain, nausea, vomiting.     1. Chest Pain  - ECG NSR 1st AVB with no ischemia noted  - Trop negative  - TTE as OP in 12/23 with preserved EF, no WMA and no significant valvular dysfunction  - NST 1/18 abnormal finding moderate to large area of anterolateral ischemia, mod-severe severity.  - Start ASA 81mg PO daily, atorvastatin 40mg PO daily  - Plan for cardiac cath today    2. Hypertension  - BP currently stable off anti-hypertensives    3. HLD  - Check lipid profile  - c/w atorvastatin 40mg PO daily    4. Chronic Pain  - 2/2 immunotherapy side effects  - Resume home pain medication regimen           Differential diagnosis and plan of care discussed with patient after the evaluation. Counseling on diet, nutritional counseling, weight management, exercise and medication compliance was done.   Advanced care planning/advanced directives discussed with patient/family. DNR status including forceful chest compressions to attempt to restart the heart, ventilator support/artificial breathing, electric shock, artificial nutrition, health care proxy, Molst form all discussed with pt. Pt wishes to consider. More than fifteen minutes spent on discussing advanced directives.     Nuvia Ritchie [facility]  Vic Carballo DO [facility]  Cardiovascular Medicine  [address] [place] [address] Dr, [address]  Available for call or text via [employer]  Office [phone]
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
## 2024-05-09 NOTE — Clinical Note
Steph Lewis was seen and treated in our emergency department on 5/9/2024.  She may return to school on 05/13/2024.      If you have any questions or concerns, please don't hesitate to call.      Chu Patel MD

## 2024-05-09 NOTE — DISCHARGE INSTRUCTIONS
You likely had a concussion.  You had a CT scan of your head and face that did not show an intracranial hemorrhage or broken bones.  Please take Tylenol and Motrin at home, we will prescribe you Zofran to help with the nausea.
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
## 2024-05-13 LAB
ATRIAL RATE: 89 BPM
P AXIS: 28 DEGREES
P OFFSET: 188 MS
P ONSET: 135 MS
PR INTERVAL: 166 MS
Q ONSET: 218 MS
QRS COUNT: 15 BEATS
QRS DURATION: 96 MS
QT INTERVAL: 374 MS
QTC CALCULATION(BAZETT): 455 MS
QTC FREDERICIA: 426 MS
R AXIS: 57 DEGREES
T AXIS: 27 DEGREES
T OFFSET: 405 MS
VENTRICULAR RATE: 89 BPM

## 2024-05-31 ENCOUNTER — OFFICE VISIT (OUTPATIENT)
Dept: OBGYN CLINIC | Age: 19
End: 2024-05-31
Payer: COMMERCIAL

## 2024-05-31 VITALS
SYSTOLIC BLOOD PRESSURE: 108 MMHG | WEIGHT: 193 LBS | BODY MASS INDEX: 37.89 KG/M2 | HEIGHT: 60 IN | DIASTOLIC BLOOD PRESSURE: 62 MMHG | HEART RATE: 97 BPM

## 2024-05-31 DIAGNOSIS — Z86.39 HISTORY OF OBESITY: ICD-10-CM

## 2024-05-31 DIAGNOSIS — Z76.89 ENCOUNTER FOR WEIGHT MANAGEMENT: Primary | ICD-10-CM

## 2024-05-31 PROCEDURE — 99213 OFFICE O/P EST LOW 20 MIN: CPT | Performed by: ADVANCED PRACTICE MIDWIFE

## 2024-05-31 RX ORDER — PHENTERMINE HYDROCHLORIDE 37.5 MG/1
37.5 TABLET ORAL
Qty: 30 TABLET | Refills: 0 | Status: SHIPPED | OUTPATIENT
Start: 2024-05-31 | End: 2024-06-30

## 2024-05-31 RX ORDER — PHENTERMINE HYDROCHLORIDE 37.5 MG/1
37.5 TABLET ORAL
Qty: 30 TABLET | Refills: 0 | Status: SHIPPED | OUTPATIENT
Start: 2024-07-30 | End: 2024-08-29

## 2024-05-31 RX ORDER — PHENTERMINE HYDROCHLORIDE 37.5 MG/1
37.5 TABLET ORAL
Qty: 30 TABLET | Refills: 0 | Status: SHIPPED | OUTPATIENT
Start: 2024-06-30 | End: 2024-07-30

## 2024-05-31 ASSESSMENT — ENCOUNTER SYMPTOMS
SHORTNESS OF BREATH: 0
ABDOMINAL PAIN: 0
NAUSEA: 0
DIARRHEA: 0
COUGH: 0
CONSTIPATION: 0
VOMITING: 0

## 2024-05-31 NOTE — PROGRESS NOTES
SUBJECTIVE:  Barbi Willson is a 19 y.o. female who presents here today for complaints of:      Chief Complaint   Patient presents with    Weight Management     Adipex refills     Encounter for Weight Loss Counseling, History of Obesity  Taking Adipex as an adjunct to improved diet and increased exercise to promote weight loss.  Using Adipex without complications, taking the medication(s) as directed, and tolerating well.  Past medical history was reviewed and updated as needed.  Denies a history of or any known current conditions that would contraindicate the use of Adipex.      Review of Systems   Respiratory:  Negative for cough and shortness of breath.    Gastrointestinal:  Negative for abdominal pain, constipation, diarrhea, nausea and vomiting.   Genitourinary:  Negative for difficulty urinating, dysuria, menstrual problem, pelvic pain, vaginal bleeding and vaginal discharge.   All other systems reviewed and are negative.    OBJECTIVE:  Vitals:  /62   Pulse 97   Ht 1.524 m (5')   Wt 87.5 kg (193 lb)   BMI 37.69 kg/m²     Wt Readings from Last 3 Encounters:   05/31/24 87.5 kg (193 lb)   04/26/24 93.4 kg (206 lb)   02/02/24 90.7 kg (200 lb) (97 %, Z= 1.94)*     * Growth percentiles are based on CDC (Girls, 2-20 Years) data.     Physical Exam  Appearance:  Normal appearance  Cardiovascular:  Normal rate, Capillary refill less than 2 seconds  Pulmonary:  Normal effort, no distress  Abdominal:  No tenderness  MS:  No Swelling, No dependent edema  Skin:  Warm, dry  Neuro:  Alert and oriented x3, reflexes normal.  Psychiatric:  Normal mood and behavior    ASSESSMENT & PLAN:   Diagnosis Orders   1. Encounter for weight management  phentermine (ADIPEX-P) 37.5 MG tablet    phentermine (ADIPEX-P) 37.5 MG tablet    phentermine (ADIPEX-P) 37.5 MG tablet      2. History of obesity        3. Body mass index (BMI) of 37.0 to 37.9 in adult  phentermine (ADIPEX-P) 37.5 MG tablet    phentermine (ADIPEX-P) 37.5 MG

## 2024-07-20 ENCOUNTER — TELEPHONE (OUTPATIENT)
Dept: OBGYN CLINIC | Age: 19
End: 2024-07-20

## 2024-07-20 DIAGNOSIS — Z34.91 PRENATAL CARE, FIRST TRIMESTER: Primary | ICD-10-CM

## 2024-07-20 DIAGNOSIS — Z34.91 PRENATAL CARE, FIRST TRIMESTER: ICD-10-CM

## 2024-07-20 DIAGNOSIS — N91.2 AMENORRHEA: ICD-10-CM

## 2024-07-20 LAB
ABO + RH BLD: NORMAL
BASOPHILS # BLD: 0 K/UL (ref 0–0.2)
BASOPHILS NFR BLD: 0.2 %
BLD GP AB SCN SERPL QL: NORMAL
EOSINOPHIL # BLD: 0.1 K/UL (ref 0–0.7)
EOSINOPHIL NFR BLD: 1.2 %
ERYTHROCYTE [DISTWIDTH] IN BLOOD BY AUTOMATED COUNT: 13.1 % (ref 11.5–14.5)
GONADOTROPIN, CHORIONIC (HCG) QUANT: 77.5 MIU/ML
HBV SURFACE AG SERPL QL IA: NORMAL
HCT VFR BLD AUTO: 40 % (ref 37–47)
HGB BLD-MCNC: 13.2 G/DL (ref 12–16)
LYMPHOCYTES # BLD: 1.7 K/UL (ref 1–4.8)
LYMPHOCYTES NFR BLD: 25.2 %
MCH RBC QN AUTO: 28.6 PG (ref 27–31.3)
MCHC RBC AUTO-ENTMCNC: 33 % (ref 33–37)
MCV RBC AUTO: 86.8 FL (ref 79.4–94.8)
MONOCYTES # BLD: 0.5 K/UL (ref 0.2–0.8)
MONOCYTES NFR BLD: 8 %
NEUTROPHILS # BLD: 4.3 K/UL (ref 1.4–6.5)
NEUTS SEG NFR BLD: 64.9 %
PLATELET # BLD AUTO: 381 K/UL (ref 130–400)
RBC # BLD AUTO: 4.61 M/UL (ref 4.2–5.4)
RUBELLA ANTIBODY IGG: 118 IU/ML
TSH REFLEX: 1.22 UIU/ML (ref 0.44–3.86)
WBC # BLD AUTO: 6.6 K/UL (ref 4.5–11)

## 2024-07-20 NOTE — TELEPHONE ENCOUNTER
Pt stated she took a pregnancy test night  and it was positive, she is requesting lab work.  Lmp 6/24

## 2024-07-21 LAB
HEPATITIS C ANTIBODY: NONREACTIVE
HIV AG/AB: NONREACTIVE
RPR SER QL: NORMAL

## 2024-07-23 LAB — VZV IGG SER IA-ACNC: 1013 IV

## 2024-07-24 ENCOUNTER — PATIENT MESSAGE (OUTPATIENT)
Dept: OBGYN CLINIC | Age: 19
End: 2024-07-24

## 2024-07-24 DIAGNOSIS — O20.0 THREATENED MISCARRIAGE IN EARLY PREGNANCY: Primary | ICD-10-CM

## 2024-07-25 ENCOUNTER — HOSPITAL ENCOUNTER (OUTPATIENT)
Dept: LAB | Age: 19
Discharge: HOME | End: 2024-07-25
Payer: COMMERCIAL

## 2024-07-25 DIAGNOSIS — O20.0 THREATENED MISCARRIAGE IN EARLY PREGNANCY: ICD-10-CM

## 2024-07-25 LAB — GONADOTROPIN, CHORIONIC (HCG) QUANT: 980.2 MIU/ML

## 2024-07-25 PROCEDURE — 36415 COLL VENOUS BLD VENIPUNCTURE: CPT

## 2024-07-25 PROCEDURE — 84702 CHORIONIC GONADOTROPIN TEST: CPT

## 2024-08-12 ENCOUNTER — OFFICE VISIT (OUTPATIENT)
Dept: OBGYN CLINIC | Age: 19
End: 2024-08-12
Payer: COMMERCIAL

## 2024-08-12 VITALS
HEIGHT: 60 IN | BODY MASS INDEX: 39.27 KG/M2 | SYSTOLIC BLOOD PRESSURE: 110 MMHG | WEIGHT: 200 LBS | DIASTOLIC BLOOD PRESSURE: 68 MMHG | HEART RATE: 76 BPM

## 2024-08-12 DIAGNOSIS — Z72.51 UNPROTECTED SEXUAL INTERCOURSE: ICD-10-CM

## 2024-08-12 DIAGNOSIS — Z3A.01 7 WEEKS GESTATION OF PREGNANCY: ICD-10-CM

## 2024-08-12 DIAGNOSIS — N91.2 AMENORRHEA: ICD-10-CM

## 2024-08-12 DIAGNOSIS — Z36.87 UNSURE OF LMP (LAST MENSTRUAL PERIOD) AS REASON FOR ULTRASOUND SCAN: ICD-10-CM

## 2024-08-12 DIAGNOSIS — N91.2 AMENORRHEA: Primary | ICD-10-CM

## 2024-08-12 DIAGNOSIS — O34.219 PREVIOUS CESAREAN SECTION COMPLICATING PREGNANCY: ICD-10-CM

## 2024-08-12 LAB
AMPHET UR QL SCN: NORMAL
BARBITURATES UR QL SCN: NORMAL
BENZODIAZ UR QL SCN: NORMAL
BILIRUB UR QL STRIP: NEGATIVE
BUPRENORPHINE+NOR UR QL SCN: NORMAL
CANNABINOIDS UR QL SCN: NORMAL
CLARITY UR: CLEAR
COCAINE UR QL SCN: NORMAL
COLOR UR: YELLOW
DRUG SCREEN COMMENT UR-IMP: NORMAL
FENTANYL SCREEN, URINE: NORMAL
GLUCOSE UR STRIP-MCNC: NEGATIVE MG/DL
HGB UR QL STRIP: NEGATIVE
KETONES UR STRIP-MCNC: NEGATIVE MG/DL
LEUKOCYTE ESTERASE UR QL STRIP: NEGATIVE
METHADONE UR QL SCN: NORMAL
NITRITE UR QL STRIP: NEGATIVE
OPIATES UR QL SCN: NORMAL
OXYCODONE UR QL SCN: NORMAL
PCP UR QL SCN: NORMAL
PH UR STRIP: 6 [PH] (ref 5–9)
PROPOXYPH UR QL SCN: NORMAL
PROT UR STRIP-MCNC: NEGATIVE MG/DL
SP GR UR STRIP: 1 (ref 1–1.03)
UROBILINOGEN UR STRIP-ACNC: 0.2 E.U./DL

## 2024-08-12 PROCEDURE — 0500F INITIAL PRENATAL CARE VISIT: CPT | Performed by: OBSTETRICS & GYNECOLOGY

## 2024-08-12 PROCEDURE — 76801 OB US < 14 WKS SINGLE FETUS: CPT | Performed by: OBSTETRICS & GYNECOLOGY

## 2024-08-12 RX ORDER — PROCHLORPERAZINE MALEATE 10 MG
10 TABLET ORAL EVERY 6 HOURS PRN
Qty: 60 TABLET | Refills: 1 | Status: SHIPPED | OUTPATIENT
Start: 2024-08-12

## 2024-08-12 RX ORDER — ONDANSETRON 4 MG/1
4 TABLET, FILM COATED ORAL EVERY 6 HOURS PRN
Qty: 30 TABLET | Refills: 1 | Status: SHIPPED | OUTPATIENT
Start: 2024-08-12

## 2024-08-12 RX ORDER — FERROUS SULFATE 325(65) MG
325 TABLET ORAL
COMMUNITY

## 2024-08-12 NOTE — PROGRESS NOTES
Fibrosis,  Labor precautions and Sickle Cell disease.    The patient was counseled on the risks of tobacco abuse. Both maternal and fetal. She was instructed to stop smoking if currently using tobacco. Morbidity, mortality, and cessation programs were reviewed. The risks include but are not limited to increased risks of  labor,  delivery, premature rupture of membranes, intrauterine growth restriction, intrauterine fetal demise and abruptio placenta. Secondary smoke risks were also reviewed. Increases in cancer, respiratory problems, and sudden infant death syndrome were reviewed as well.    The patient was informed of a 2-4% risk of congenital anomalies in the general population. She was also informed that karyotyping is the only way to evaluate the fetus for genetic problems and genetic lethal anomalies. Chorionic villous sampling, amniocentesis and Maternal Genetic Blood Sampling-(NIPT Testing) were also discussed with morbidity rates in detail. She requested any of the options.    Route of delivery and counseling on vaginal, operative vaginal, and  sections were completed with the risks of each to both the patient as well as her baby. The possibility of a blood transfusion was discussed as well. The patient was not opposed to receiving a transfusion if needed.    Nuchal translucency and MSAFP single marker testing was reviewed in detail with attention to timing of testing and their windows. For patients beyond the gestational age for Nuchal translucency evaluation Quad testing was recommended. Timing for the Quad test was reviewed. Benefits of the above testing was reviewed. A second trimester amniocentesis was also made available to the patient. Risks, Benefits and non-invasive alternative testing was reviewed.     The literature regarding a questionable link to pitocin augmentation and induction of labor, the assistance of labor contractions and the initiation of contractions to

## 2024-08-14 LAB — BACTERIA UR CULT: NORMAL

## 2024-08-16 ENCOUNTER — TELEPHONE (OUTPATIENT)
Dept: OBGYN CLINIC | Age: 19
End: 2024-08-16

## 2024-08-16 DIAGNOSIS — M54.50 ACUTE LEFT-SIDED LOW BACK PAIN, UNSPECIFIED WHETHER SCIATICA PRESENT: Primary | ICD-10-CM

## 2024-08-16 DIAGNOSIS — M54.50 ACUTE LEFT-SIDED LOW BACK PAIN, UNSPECIFIED WHETHER SCIATICA PRESENT: ICD-10-CM

## 2024-08-16 LAB
BACTERIA URNS QL MICRO: ABNORMAL /HPF
BILIRUB UR QL STRIP: NEGATIVE
C TRACH DNA UR QL NAA+PROBE: NEGATIVE
CLARITY UR: CLEAR
COLOR UR: YELLOW
EPI CELLS #/AREA URNS AUTO: ABNORMAL /HPF (ref 0–5)
GLUCOSE UR STRIP-MCNC: NEGATIVE MG/DL
HGB UR QL STRIP: NEGATIVE
HYALINE CASTS #/AREA URNS AUTO: ABNORMAL /HPF (ref 0–5)
KETONES UR STRIP-MCNC: NEGATIVE MG/DL
LEUKOCYTE ESTERASE UR QL STRIP: NEGATIVE
N GONORRHOEA DNA UR QL NAA+PROBE: NEGATIVE
NITRITE UR QL STRIP: NEGATIVE
PH UR STRIP: 6.5 [PH] (ref 5–9)
PROT UR STRIP-MCNC: NEGATIVE MG/DL
RBC #/AREA URNS HPF: ABNORMAL /HPF (ref 0–2)
SP GR UR STRIP: 1.02 (ref 1–1.03)
UROBILINOGEN UR STRIP-ACNC: 0.2 E.U./DL
WBC #/AREA URNS AUTO: ABNORMAL /HPF (ref 0–5)
YEAST URNS QL MICRO: PRESENT /HPF

## 2024-08-16 NOTE — TELEPHONE ENCOUNTER
Patient complaining of lower back pain on her left side that started yesterday morning. She is concerned for possible UTI. UA and culture from 8/12/24 were normal. Urine resent.

## 2024-08-17 DIAGNOSIS — R10.9 LEFT FLANK PAIN: Primary | ICD-10-CM

## 2024-08-17 RX ORDER — NITROFURANTOIN 25; 75 MG/1; MG/1
100 CAPSULE ORAL 2 TIMES DAILY
Qty: 14 CAPSULE | Refills: 0 | Status: SHIPPED | OUTPATIENT
Start: 2024-08-17 | End: 2024-08-24

## 2024-08-17 RX ORDER — PHENAZOPYRIDINE HYDROCHLORIDE 200 MG/1
200 TABLET, FILM COATED ORAL 3 TIMES DAILY PRN
Qty: 15 TABLET | Refills: 0 | Status: SHIPPED | OUTPATIENT
Start: 2024-08-17 | End: 2024-08-22

## 2024-08-18 LAB — BACTERIA UR CULT: NORMAL

## 2024-08-21 RX ORDER — TERCONAZOLE 0.4 %
CREAM WITH APPLICATOR VAGINAL
Qty: 1 EACH | Refills: 0 | Status: SHIPPED | OUTPATIENT
Start: 2024-08-21 | End: 2024-08-27

## 2024-08-27 ENCOUNTER — INITIAL PRENATAL (OUTPATIENT)
Dept: OBGYN CLINIC | Age: 19
End: 2024-08-27

## 2024-08-27 VITALS
HEART RATE: 88 BPM | DIASTOLIC BLOOD PRESSURE: 76 MMHG | BODY MASS INDEX: 38.28 KG/M2 | SYSTOLIC BLOOD PRESSURE: 104 MMHG | WEIGHT: 196 LBS

## 2024-08-27 DIAGNOSIS — Z34.81 ENCOUNTER FOR SUPERVISION OF OTHER NORMAL PREGNANCY IN FIRST TRIMESTER: ICD-10-CM

## 2024-08-27 DIAGNOSIS — Z3A.09 9 WEEKS GESTATION OF PREGNANCY: Primary | ICD-10-CM

## 2024-08-27 PROCEDURE — 0500F INITIAL PRENATAL CARE VISIT: CPT | Performed by: OBSTETRICS & GYNECOLOGY

## 2024-08-27 RX ORDER — TERCONAZOLE 8 MG/G
CREAM VAGINAL
Qty: 1 EACH | Refills: 0 | Status: SHIPPED | OUTPATIENT
Start: 2024-08-27 | End: 2024-09-03

## 2024-08-27 NOTE — PROGRESS NOTES
OB visit      Barbi Willson is a 19 y.o. year old female  @ L 24, 9.1 wks , REBECA 3/31/25  consistent with office 7 wk US. Complaints : nausea without vomiting for days .     POB: FTLTCS , 39 wks ,  , female , 8lbs , nonre-assuring heart beats . Failed induction . Arrest at 1 cm .     PGYN: denies     PMH: obesity     PSH: LTCS     MEDS: PNV     Drug Allergies:   Allergies   Allergen Reactions    Bactrim [Sulfamethoxazole-Trimethoprim] Nausea And Vomiting    Reglan [Metoclopramide] Headaches         SOCHX: vapes , trying to quit.     FH: Mother or Father , DM No , HTN No, Other No    /76   Pulse 88   Wt 88.9 kg (196 lb)   LMP 2024 (Approximate)   BMI 38.28 kg/m²   Past Medical History:   Diagnosis Date    History of migraine headaches      Past Surgical History:   Procedure Laterality Date     SECTION N/A 2022     SECTION performed by Jelena Parikh MD at [facility] L&D OR    FEMUR FRACTURE SURGERY           Review of Systems  Constitutional: negative  Genitourinary:see above  Integument/breast: negative  Behavioral/Psych: negative  Endocrine: negative     All other systems reviewed and are negative.       Physical Exam:  /76   Pulse 88   Wt 88.9 kg (196 lb)   LMP 2024 (Approximate)   BMI 38.28 kg/m²   Skin: Warm, dry, no lesions or rashes  Extremities: Without clubbing, cyanosis and edema. Palms and nails are normal. Ambulates without difficulty  Neurological: No gross sensory or motor deficits.  Abdomen: Soft, non-tender without masses or organomegaly  bowel sounds normoactive    HOF : not palpable     FHT : 150 bpm     Assessment:   1. 9 weeks gestation of pregnancy    2. Encounter for supervision of other normal pregnancy in first trimester          Plan:   Orders Placed This Encounter   Procedures    Panorama Prenatal Test       ==========Department Information==========  ID: [id number]  Department:[facility],

## 2024-09-11 DIAGNOSIS — O20.0 THREATENED MISCARRIAGE IN EARLY PREGNANCY: ICD-10-CM

## 2024-09-11 DIAGNOSIS — Z34.81 ENCOUNTER FOR SUPERVISION OF OTHER NORMAL PREGNANCY, FIRST TRIMESTER: ICD-10-CM

## 2024-09-11 LAB
GONADOTROPIN, CHORIONIC (HCG) QUANT: NORMAL MIU/ML
Lab: NORMAL
NTRA FETAL FRACTION: NORMAL
NTRA GENDER OF FETUS: NORMAL
NTRA MONOSOMY X AGE-BASED RISK TEXT: NORMAL
NTRA MONOSOMY X RESULT TEXT: NORMAL
NTRA MONOSOMY X RISK SCORE TEXT: NORMAL
NTRA TRIPLOIDY RESULT TEXT: NORMAL
NTRA TRISOMY 13 AGE-BASED RISK TEXT: NORMAL
NTRA TRISOMY 13 RESULT TEXT: NORMAL
NTRA TRISOMY 13 RISK SCORE TEXT: NORMAL
NTRA TRISOMY 18 AGE-BASED RISK TEXT: NORMAL
NTRA TRISOMY 18 RESULT TEXT: NORMAL
NTRA TRISOMY 18 RISK SCORE TEXT: NORMAL
NTRA TRISOMY 21 AGE-BASED RISK TEXT: NORMAL
NTRA TRISOMY 21 RESULT TEXT: NORMAL
NTRA TRISOMY 21 RISK SCORE TEXT: NORMAL

## 2024-09-19 ENCOUNTER — TELEPHONE (OUTPATIENT)
Dept: OBGYN CLINIC | Age: 19
End: 2024-09-19

## 2024-09-19 LAB
Lab: NORMAL
NTRA 22Q11.2 DELETION SYNDROME POPULATION-BASED RISK TEXT: NORMAL
NTRA 22Q11.2 DELETION SYNDROME RESULT TEXT: NORMAL
NTRA 22Q11.2 DELETION SYNDROME RISK SCORE TEXT: NORMAL
NTRA FETAL FRACTION: NORMAL
NTRA FETAL RHD SUMMARY: NORMAL
NTRA GENDER OF FETUS: NORMAL
NTRA MONOSOMY X AGE-BASED RISK TEXT: NORMAL
NTRA MONOSOMY X RESULT TEXT: NORMAL
NTRA MONOSOMY X RISK SCORE TEXT: NORMAL
NTRA TRIPLOIDY RESULT TEXT: NORMAL
NTRA TRISOMY 13 AGE-BASED RISK TEXT: NORMAL
NTRA TRISOMY 13 RESULT TEXT: NORMAL
NTRA TRISOMY 13 RISK SCORE TEXT: NORMAL
NTRA TRISOMY 18 AGE-BASED RISK TEXT: NORMAL
NTRA TRISOMY 18 RESULT TEXT: NORMAL
NTRA TRISOMY 18 RISK SCORE TEXT: NORMAL
NTRA TRISOMY 21 AGE-BASED RISK TEXT: NORMAL
NTRA TRISOMY 21 RESULT TEXT: NORMAL
NTRA TRISOMY 21 RISK SCORE TEXT: NORMAL

## 2024-09-23 ENCOUNTER — ROUTINE PRENATAL (OUTPATIENT)
Dept: OBGYN CLINIC | Age: 19
End: 2024-09-23

## 2024-09-23 VITALS
HEART RATE: 84 BPM | WEIGHT: 195 LBS | BODY MASS INDEX: 38.08 KG/M2 | DIASTOLIC BLOOD PRESSURE: 62 MMHG | SYSTOLIC BLOOD PRESSURE: 108 MMHG

## 2024-09-23 DIAGNOSIS — Z3A.13 13 WEEKS GESTATION OF PREGNANCY: ICD-10-CM

## 2024-09-23 DIAGNOSIS — Z34.81 ENCOUNTER FOR SUPERVISION OF OTHER NORMAL PREGNANCY IN FIRST TRIMESTER: Primary | ICD-10-CM

## 2024-09-23 PROCEDURE — 0502F SUBSEQUENT PRENATAL CARE: CPT | Performed by: OBSTETRICS & GYNECOLOGY

## 2024-10-14 DIAGNOSIS — O26.899 PREGNANCY HEADACHE, ANTEPARTUM: Primary | ICD-10-CM

## 2024-10-14 DIAGNOSIS — R51.9 PREGNANCY HEADACHE, ANTEPARTUM: Primary | ICD-10-CM

## 2024-10-14 RX ORDER — BUTALBITAL, ACETAMINOPHEN AND CAFFEINE 50; 325; 40 MG/1; MG/1; MG/1
2 TABLET ORAL EVERY 6 HOURS PRN
Qty: 40 TABLET | Refills: 0 | Status: SHIPPED | OUTPATIENT
Start: 2024-10-14

## 2024-10-22 ENCOUNTER — ROUTINE PRENATAL (OUTPATIENT)
Dept: OBGYN CLINIC | Age: 19
End: 2024-10-22

## 2024-10-22 VITALS
WEIGHT: 195 LBS | SYSTOLIC BLOOD PRESSURE: 102 MMHG | HEART RATE: 72 BPM | DIASTOLIC BLOOD PRESSURE: 70 MMHG | BODY MASS INDEX: 38.08 KG/M2

## 2024-10-22 DIAGNOSIS — Z3A.18 18 WEEKS GESTATION OF PREGNANCY: ICD-10-CM

## 2024-10-22 DIAGNOSIS — Z34.81 ENCOUNTER FOR SUPERVISION OF OTHER NORMAL PREGNANCY IN FIRST TRIMESTER: Primary | ICD-10-CM

## 2024-10-22 NOTE — PROGRESS NOTES
OB visit      Barbi Willson is a 19 y.o. year old female  @ L 24, 17.1 wks , REBECA 3/31/25  consistent with office 7 wk US. Complaints : nausea without vomiting for days .     POB: FTLTCS , 39 wks ,  , female , 8lbs , nonre-assuring heart beats . Failed induction . Arrest at 1 cm .     PGYN: denies     PMH: obesity     PSH: LTCS     MEDS: PNV     Drug Allergies:   Allergies   Allergen Reactions    Bactrim [Sulfamethoxazole-Trimethoprim] Nausea And Vomiting    Reglan [Metoclopramide] Headaches         SOCHX: vapes , trying to quit.     FH: Mother or Father , DM No , HTN No, Other No    /70   Pulse 72   Wt 88.5 kg (195 lb)   LMP 2024 (Approximate)   BMI 38.08 kg/m²   Past Medical History:   Diagnosis Date    History of migraine headaches      Past Surgical History:   Procedure Laterality Date     SECTION N/A 2022     SECTION performed by Jelena Parikh MD at [facility] [place] L&D OR    FEMUR FRACTURE SURGERY           Review of Systems  Constitutional: negative  Genitourinary:see above  Integument/breast: negative  Behavioral/Psych: negative  Endocrine: negative     All other systems reviewed and are negative.       Physical Exam:  /70   Pulse 72   Wt 88.5 kg (195 lb)   LMP 2024 (Approximate)   BMI 38.08 kg/m²   Skin: Warm, dry, no lesions or rashes  Extremities: Without clubbing, cyanosis and edema. Palms and nails are normal. Ambulates without difficulty  Neurological: No gross sensory or motor deficits.  Abdomen: Soft, non-tender without masses or organomegaly  bowel sounds normoactive    HOF : umbilical     FHT : 150 bpm     Assessment:   1. Encounter for supervision of other normal pregnancy in first trimester    2. 18 weeks gestation of pregnancy          Plan:   Orders Placed This Encounter   Procedures    US OB 14 PLUS WEEKS SINGLE OR FIRST GESTATION     This procedure can be scheduled via [employer].      Standing Status:   Future     Standing Expiration

## 2024-11-01 ENCOUNTER — HOSPITAL ENCOUNTER (OUTPATIENT)
Dept: ULTRASOUND IMAGING | Age: 19
Discharge: HOME | End: 2024-11-03
Payer: COMMERCIAL

## 2024-11-01 DIAGNOSIS — Z3A.18 18 WEEKS GESTATION OF PREGNANCY: ICD-10-CM

## 2024-11-01 PROCEDURE — 76817 TRANSVAGINAL US OBSTETRIC: CPT

## 2024-11-01 PROCEDURE — 76805 OB US >/= 14 WKS SNGL FETUS: CPT

## 2024-11-14 ENCOUNTER — TELEPHONE (OUTPATIENT)
Dept: OBGYN CLINIC | Age: 19
End: 2024-11-14

## 2024-11-14 NOTE — TELEPHONE ENCOUNTER
Patient c/o vaginal discharge, odor, and mild cramping. She's requesting something be sent over for BV.

## 2024-11-19 ENCOUNTER — ROUTINE PRENATAL (OUTPATIENT)
Dept: OBGYN CLINIC | Age: 19
End: 2024-11-19
Payer: MEDICAID

## 2024-11-19 VITALS
HEART RATE: 68 BPM | WEIGHT: 200 LBS | DIASTOLIC BLOOD PRESSURE: 64 MMHG | BODY MASS INDEX: 39.06 KG/M2 | SYSTOLIC BLOOD PRESSURE: 102 MMHG

## 2024-11-19 DIAGNOSIS — Z3A.21 21 WEEKS GESTATION OF PREGNANCY: ICD-10-CM

## 2024-11-19 DIAGNOSIS — Z34.81 ENCOUNTER FOR SUPERVISION OF OTHER NORMAL PREGNANCY IN FIRST TRIMESTER: Primary | ICD-10-CM

## 2024-11-19 PROCEDURE — 99213 OFFICE O/P EST LOW 20 MIN: CPT | Performed by: OBSTETRICS & GYNECOLOGY

## 2024-11-19 RX ORDER — METRONIDAZOLE 500 MG/1
500 TABLET ORAL 2 TIMES DAILY
Qty: 14 TABLET | Refills: 0 | Status: SHIPPED | OUTPATIENT
Start: 2024-11-19 | End: 2024-11-26

## 2024-11-19 NOTE — PROGRESS NOTES
OB visit      Barbi Willson is a 19 y.o. year old female  @ L 24, 21.1 wks , REBECA 3/31/25  consistent with office 7 wk US. Complaints : nausea without vomiting for days .     POB: FTLTCS , 39 wks ,  , female , 8lbs , nonre-assuring heart beats . Failed induction . Arrest at 1 cm .     PGYN: denies     PMH: obesity     PSH: LTCS     MEDS: PNV     Drug Allergies:   Allergies   Allergen Reactions    Bactrim [Sulfamethoxazole-Trimethoprim] Nausea And Vomiting    Reglan [Metoclopramide] Headaches         SOCHX: vapes , trying to quit.     FH: Mother or Father , DM No , HTN No, Other No    /64   Pulse 68   Wt 90.7 kg (200 lb)   LMP 2024 (Approximate)   BMI 39.06 kg/m²   Past Medical History:   Diagnosis Date    History of migraine headaches      Past Surgical History:   Procedure Laterality Date     SECTION N/A 2022     SECTION performed by Jelena Parikh MD at [facility] L&D OR    FEMUR FRACTURE SURGERY           Review of Systems  Constitutional: negative  Genitourinary:see above  Integument/breast: negative  Behavioral/Psych: negative  Endocrine: negative     All other systems reviewed and are negative.       Physical Exam:  /64   Pulse 68   Wt 90.7 kg (200 lb)   LMP 2024 (Approximate)   BMI 39.06 kg/m²   Skin: Warm, dry, no lesions or rashes  Extremities: Without clubbing, cyanosis and edema. Palms and nails are normal. Ambulates without difficulty  Neurological: No gross sensory or motor deficits.  Abdomen: Soft, non-tender without masses or organomegaly  bowel sounds normoactive    HOF : umbilical     FHT : 150 bpm     Assessment:   1. Encounter for supervision of other normal pregnancy in first trimester    2. 21 weeks gestation of pregnancy        Plan:   No orders of the defined types were placed in this encounter.       No orders of the defined types were placed in this encounter.    Plan:   Jelena Parikh MD      Follow-up in 4 weeks  Early 1 hr

## 2024-12-17 ENCOUNTER — ROUTINE PRENATAL (OUTPATIENT)
Dept: OBGYN CLINIC | Age: 19
End: 2024-12-17
Payer: COMMERCIAL

## 2024-12-17 VITALS
SYSTOLIC BLOOD PRESSURE: 100 MMHG | DIASTOLIC BLOOD PRESSURE: 62 MMHG | BODY MASS INDEX: 40.62 KG/M2 | HEART RATE: 96 BPM | WEIGHT: 208 LBS

## 2024-12-17 DIAGNOSIS — Z3A.25 25 WEEKS GESTATION OF PREGNANCY: ICD-10-CM

## 2024-12-17 DIAGNOSIS — Z34.82 ENCOUNTER FOR SUPERVISION OF OTHER NORMAL PREGNANCY IN SECOND TRIMESTER: Primary | ICD-10-CM

## 2024-12-17 PROCEDURE — 99213 OFFICE O/P EST LOW 20 MIN: CPT | Performed by: OBSTETRICS & GYNECOLOGY

## 2024-12-17 NOTE — PROGRESS NOTES
OB visit      Barbi Willson is a 19 y.o. year old female  @ L 24, 25.1 wks , REBECA 3/31/25  consistent with office 7 wk US. Complaints : nausea without vomiting for days .     POB: FTLTCS , 39 wks ,  , female , 8lbs , nonre-assuring heart beats . Failed induction . Arrest at 1 cm .     PGYN: denies     PMH: obesity     PSH: LTCS     MEDS: PNV     Drug Allergies:   Allergies   Allergen Reactions    Bactrim [Sulfamethoxazole-Trimethoprim] Nausea And Vomiting    Reglan [Metoclopramide] Headaches         SOCHX: vapes , trying to quit.     FH: Mother or Father , DM No , HTN No, Other No    /62   Pulse 96   Wt 94.3 kg (208 lb)   LMP 2024 (Approximate)   BMI 40.62 kg/m²   Past Medical History:   Diagnosis Date    History of migraine headaches      Past Surgical History:   Procedure Laterality Date     SECTION N/A 2022     SECTION performed by Jelena Parikh MD at [facility] [place] L&D OR    FEMUR FRACTURE SURGERY           Review of Systems  Constitutional: negative  Genitourinary:see above  Integument/breast: negative  Behavioral/Psych: negative  Endocrine: negative     All other systems reviewed and are negative.       Physical Exam:  /62   Pulse 96   Wt 94.3 kg (208 lb)   LMP 2024 (Approximate)   BMI 40.62 kg/m²   Skin: Warm, dry, no lesions or rashes  Extremities: Without clubbing, cyanosis and edema. Palms and nails are normal. Ambulates without difficulty  Neurological: No gross sensory or motor deficits.  Abdomen: Soft, non-tender without masses or organomegaly  bowel sounds normoactive    HOF : 25 cm     FHT : 150 bpm     Assessment:   1. Encounter for supervision of other normal pregnancy in second trimester    2. 25 weeks gestation of pregnancy        Plan:   Orders Placed This Encounter   Procedures    CBC with Auto Differential     Standing Status:   Future     Standing Expiration Date:   2025    Glucose tolerance, 1 hour     Standing Status:

## 2024-12-27 ENCOUNTER — TELEPHONE (OUTPATIENT)
Dept: OBGYN CLINIC | Age: 19
End: 2024-12-27

## 2024-12-27 NOTE — TELEPHONE ENCOUNTER
Pt called to get guidance her ankles and feet are swollen and tight. They are painful to walk on. ( I spoke to a nurse at the office. She advised me to tell patient to her BP checked asap if it's high to go to labor and delivery. If it's normal go home elevate her feet hydrate with water and watch sodium in take. Patient agreed will get her BP check now.(Pt does not have a BP cuff at home)

## 2024-12-31 DIAGNOSIS — Z34.82 ENCOUNTER FOR SUPERVISION OF OTHER NORMAL PREGNANCY IN SECOND TRIMESTER: ICD-10-CM

## 2024-12-31 LAB
BASOPHILS # BLD: 0 K/UL (ref 0–0.2)
BASOPHILS NFR BLD: 0.1 %
EOSINOPHIL # BLD: 0 K/UL (ref 0–0.7)
EOSINOPHIL NFR BLD: 0.4 %
ERYTHROCYTE [DISTWIDTH] IN BLOOD BY AUTOMATED COUNT: 13.2 % (ref 11.5–14.5)
GLUCOSE, 1HR PP: 150 MG/DL (ref 60–140)
HCT VFR BLD AUTO: 33.8 % (ref 37–47)
HGB BLD-MCNC: 11.3 G/DL (ref 12–16)
LYMPHOCYTES # BLD: 1.1 K/UL (ref 1–4.8)
LYMPHOCYTES NFR BLD: 14.6 %
MCH RBC QN AUTO: 29.2 PG (ref 27–31.3)
MCHC RBC AUTO-ENTMCNC: 33.4 % (ref 33–37)
MCV RBC AUTO: 87.3 FL (ref 79.4–94.8)
MONOCYTES # BLD: 0.5 K/UL (ref 0.2–0.8)
MONOCYTES NFR BLD: 6.5 %
NEUTROPHILS # BLD: 6 K/UL (ref 1.4–6.5)
NEUTS SEG NFR BLD: 78 %
PLATELET # BLD AUTO: 262 K/UL (ref 130–400)
RBC # BLD AUTO: 3.87 M/UL (ref 4.2–5.4)
REAGIN+T PALLIDUM IGG+IGM SERPL-IMP: NORMAL
WBC # BLD AUTO: 7.7 K/UL (ref 4.5–11)

## 2025-01-02 DIAGNOSIS — R73.09 ABNORMAL GTT (GLUCOSE TOLERANCE TEST): ICD-10-CM

## 2025-01-02 DIAGNOSIS — R73.09 ABNORMAL GTT (GLUCOSE TOLERANCE TEST): Primary | ICD-10-CM

## 2025-01-02 LAB
GLUCOSE FASTING: 80 MG/DL (ref 0–95)
GLUCOSE TOLERANCE TEST 1 HOUR: 132 MG/DL (ref 0–180)
GLUCOSE TOLERANCE TEST 2 HOUR: 101 MG/DL (ref 0–155)
GLUCOSE TOLERANCE TEST 3 HOUR: 96 MG/DL (ref 0–140)

## 2025-01-04 DIAGNOSIS — R73.09 ABNORMAL GLUCOSE TOLERANCE TEST: Primary | ICD-10-CM

## 2025-01-22 ENCOUNTER — ROUTINE PRENATAL (OUTPATIENT)
Dept: OBGYN CLINIC | Age: 20
End: 2025-01-22

## 2025-01-22 VITALS
DIASTOLIC BLOOD PRESSURE: 62 MMHG | SYSTOLIC BLOOD PRESSURE: 100 MMHG | WEIGHT: 214 LBS | BODY MASS INDEX: 41.79 KG/M2 | HEART RATE: 84 BPM

## 2025-01-22 DIAGNOSIS — Z3A.30 30 WEEKS GESTATION OF PREGNANCY: ICD-10-CM

## 2025-01-22 DIAGNOSIS — Z34.82 ENCOUNTER FOR SUPERVISION OF OTHER NORMAL PREGNANCY IN SECOND TRIMESTER: Primary | ICD-10-CM

## 2025-01-22 PROCEDURE — 0502F SUBSEQUENT PRENATAL CARE: CPT | Performed by: OBSTETRICS & GYNECOLOGY

## 2025-01-22 RX ORDER — ESCITALOPRAM OXALATE 10 MG/1
10 TABLET ORAL DAILY
Qty: 30 TABLET | Refills: 1 | Status: SHIPPED | OUTPATIENT
Start: 2025-01-22

## 2025-01-22 SDOH — ECONOMIC STABILITY: FOOD INSECURITY: WITHIN THE PAST 12 MONTHS, THE FOOD YOU BOUGHT JUST DIDN'T LAST AND YOU DIDN'T HAVE MONEY TO GET MORE.: NEVER TRUE

## 2025-01-22 SDOH — ECONOMIC STABILITY: FOOD INSECURITY: WITHIN THE PAST 12 MONTHS, YOU WORRIED THAT YOUR FOOD WOULD RUN OUT BEFORE YOU GOT MONEY TO BUY MORE.: NEVER TRUE

## 2025-01-22 ASSESSMENT — PATIENT HEALTH QUESTIONNAIRE - PHQ9
1. LITTLE INTEREST OR PLEASURE IN DOING THINGS: NEARLY EVERY DAY
SUM OF ALL RESPONSES TO PHQ QUESTIONS 1-9: 19
8. MOVING OR SPEAKING SO SLOWLY THAT OTHER PEOPLE COULD HAVE NOTICED. OR THE OPPOSITE, BEING SO FIGETY OR RESTLESS THAT YOU HAVE BEEN MOVING AROUND A LOT MORE THAN USUAL: NOT AT ALL
5. POOR APPETITE OR OVEREATING: SEVERAL DAYS
2. FEELING DOWN, DEPRESSED OR HOPELESS: NEARLY EVERY DAY
7. TROUBLE CONCENTRATING ON THINGS, SUCH AS READING THE NEWSPAPER OR WATCHING TELEVISION: NEARLY EVERY DAY
SUM OF ALL RESPONSES TO PHQ9 QUESTIONS 1 & 2: 6
10. IF YOU CHECKED OFF ANY PROBLEMS, HOW DIFFICULT HAVE THESE PROBLEMS MADE IT FOR YOU TO DO YOUR WORK, TAKE CARE OF THINGS AT HOME, OR GET ALONG WITH OTHER PEOPLE: VERY DIFFICULT
SUM OF ALL RESPONSES TO PHQ QUESTIONS 1-9: 19
3. TROUBLE FALLING OR STAYING ASLEEP: NEARLY EVERY DAY
9. THOUGHTS THAT YOU WOULD BE BETTER OFF DEAD, OR OF HURTING YOURSELF: NOT AT ALL
SUM OF ALL RESPONSES TO PHQ QUESTIONS 1-9: 19
4. FEELING TIRED OR HAVING LITTLE ENERGY: NEARLY EVERY DAY
SUM OF ALL RESPONSES TO PHQ QUESTIONS 1-9: 19
6. FEELING BAD ABOUT YOURSELF - OR THAT YOU ARE A FAILURE OR HAVE LET YOURSELF OR YOUR FAMILY DOWN: NEARLY EVERY DAY

## 2025-01-22 NOTE — PROGRESS NOTES
OB visit      Barbi Willson is a 19 y.o. year old female  @ L 24, 30.2 wks , REBECA 3/31/25  consistent with office 7 wk US. Complaints : nausea without vomiting for days .   History of Present Illness  The patient presents for evaluation of depression.    She reports experiencing depressive symptoms for the past few weeks, which she attributes to her recent relocation to her father's residence. She also mentions occasional feelings of anxiety. She expresses concern about potential postpartum depression, given her current emotional state. She has a history of postpartum depression, for which she was previously prescribed medication.     POB: FTLTCS , 39 wks ,  , female , 8lbs , nonre-assuring heart beats . Failed induction . Arrest at 1 cm .     PGYN: denies     PMH: obesity     PSH: LTCS     MEDS: PNV     Drug Allergies:   Allergies   Allergen Reactions    Bactrim [Sulfamethoxazole-Trimethoprim] Nausea And Vomiting    Reglan [Metoclopramide] Headaches         SOCHX: vapes , trying to quit.     FH: Mother or Father , DM No , HTN No, Other No    /62   Pulse 84   Wt 97.1 kg (214 lb)   LMP 2024 (Approximate)   BMI 41.79 kg/m²   Past Medical History:   Diagnosis Date    History of migraine headaches      Past Surgical History:   Procedure Laterality Date     SECTION N/A 2022     SECTION performed by Jelena Parikh MD at [facility] [place] L&D OR    FEMUR FRACTURE SURGERY           Review of Systems  Constitutional: negative  Genitourinary:see above  Integument/breast: negative  Behavioral/Psych: negative  Endocrine: negative     All other systems reviewed and are negative.       Physical Exam:  /62   Pulse 84   Wt 97.1 kg (214 lb)   LMP 2024 (Approximate)   BMI 41.79 kg/m²   Skin: Warm, dry, no lesions or rashes  Extremities: Without clubbing, cyanosis and edema. Palms and nails are normal. Ambulates without difficulty  Neurological: No gross sensory or motor

## 2025-01-30 ENCOUNTER — TELEPHONE (OUTPATIENT)
Dept: OBGYN CLINIC | Age: 20
End: 2025-01-30

## 2025-01-30 NOTE — TELEPHONE ENCOUNTER
Pt called, stated they're having period like cramping / carson dupont at random the last 2 days. Advised per nurse in office, could be due to dehydration. when the feeling of the cramping starts , to drink 3 , 8oz glasses of water , as fast as they can and to lay on their left side for 20-30mins to see if that helps. Pt understood.

## 2025-02-05 ENCOUNTER — ROUTINE PRENATAL (OUTPATIENT)
Dept: OBGYN CLINIC | Age: 20
End: 2025-02-05

## 2025-02-05 VITALS
BODY MASS INDEX: 41.99 KG/M2 | HEART RATE: 80 BPM | SYSTOLIC BLOOD PRESSURE: 100 MMHG | WEIGHT: 215 LBS | DIASTOLIC BLOOD PRESSURE: 62 MMHG

## 2025-02-05 DIAGNOSIS — Z34.82 ENCOUNTER FOR SUPERVISION OF OTHER NORMAL PREGNANCY IN SECOND TRIMESTER: ICD-10-CM

## 2025-02-05 DIAGNOSIS — O47.00 PRETERM CONTRACTIONS: ICD-10-CM

## 2025-02-05 DIAGNOSIS — Z3A.32 32 WEEKS GESTATION OF PREGNANCY: Primary | ICD-10-CM

## 2025-02-05 PROCEDURE — 0502F SUBSEQUENT PRENATAL CARE: CPT | Performed by: OBSTETRICS & GYNECOLOGY

## 2025-02-05 RX ORDER — NIFEDIPINE 10 MG/1
10 CAPSULE ORAL 3 TIMES DAILY
Qty: 60 CAPSULE | Refills: 0 | Status: SHIPPED | OUTPATIENT
Start: 2025-02-05

## 2025-02-05 RX ORDER — PNV NO.95/FERROUS FUM/FOLIC AC 28MG-0.8MG
1 TABLET ORAL 2 TIMES DAILY
Qty: 60 TABLET | Refills: 2 | Status: SHIPPED | OUTPATIENT
Start: 2025-02-05

## 2025-02-05 NOTE — PROGRESS NOTES
deficits.  Abdomen: Soft, non-tender without masses or organomegaly  bowel sounds normoactive    HOF : 33 cm     FHT : 150 bpm     Assessment:   1. 32 weeks gestation of pregnancy    2. Encounter for supervision of other normal pregnancy in second trimester    3.  contractions            Plan:   Orders Placed This Encounter   Procedures    US OB 1 OR MORE FETUS LIMITED     This procedure can be scheduled via MyChart.      Standing Status:   Future     Standing Expiration Date:   2026     Order Specific Question:   Reason for exam:     Answer:   EFW, JUAN ALBERTO, cervical length, presentation     Order Specific Question:   Release to patient - Note: Delayed release will only apply to this order. Orders that are changed or resulted outside of the EHR will not respect a delayed release to MyChart.     Answer:   Delay Immediate Release by 3 Days     Order Specific Question:   Reason for preventing immediate release     Answer:   Likely risk of substantial harm [[id number]]    US OB TRANSVAGINAL     This procedure can be scheduled via MyChart.      Standing Status:   Future     Standing Expiration Date:   2026     Order Specific Question:   Reason for exam:     Answer:   cervical length        Orders Placed This Encounter   Medications    NIFEdipine (PROCARDIA) 10 MG immediate release capsule     Sig: Take 1 capsule by mouth 3 times daily     Dispense:  60 capsule     Refill:  0    Ferrous Sulfate (IRON) 325 (65 Fe) MG TABS     Sig: Take 1 tablet by mouth 2 times daily     Dispense:  60 tablet     Refill:  2     Plan:   Jelena Parikh MD    Assessment & Plan  1. Depression.  Her depressive symptoms are likely exacerbated by her current living situation and the stress associated with her pregnancy. She has a history of postpartum depression, which further complicates her emotional well-being. A prescription for Lexapro 10 mg daily has been provided. She has been informed that initial side effects may occur but

## 2025-02-24 ENCOUNTER — HOSPITAL ENCOUNTER (OUTPATIENT)
Dept: ULTRASOUND IMAGING | Age: 20
Discharge: HOME | End: 2025-02-26
Payer: COMMERCIAL

## 2025-02-24 ENCOUNTER — APPOINTMENT (OUTPATIENT)
Dept: ULTRASOUND IMAGING | Age: 20
End: 2025-02-24
Payer: COMMERCIAL

## 2025-02-24 ENCOUNTER — ROUTINE PRENATAL (OUTPATIENT)
Dept: OBGYN CLINIC | Age: 20
End: 2025-02-24

## 2025-02-24 VITALS
SYSTOLIC BLOOD PRESSURE: 112 MMHG | DIASTOLIC BLOOD PRESSURE: 70 MMHG | HEART RATE: 76 BPM | WEIGHT: 220 LBS | BODY MASS INDEX: 42.97 KG/M2

## 2025-02-24 DIAGNOSIS — Z3A.35 35 WEEKS GESTATION OF PREGNANCY: ICD-10-CM

## 2025-02-24 DIAGNOSIS — Z34.82 ENCOUNTER FOR SUPERVISION OF OTHER NORMAL PREGNANCY IN SECOND TRIMESTER: Primary | ICD-10-CM

## 2025-02-24 DIAGNOSIS — O47.00 PRETERM CONTRACTIONS: ICD-10-CM

## 2025-02-24 DIAGNOSIS — O34.219 PREVIOUS CESAREAN SECTION COMPLICATING PREGNANCY: ICD-10-CM

## 2025-02-24 PROCEDURE — 0502F SUBSEQUENT PRENATAL CARE: CPT | Performed by: OBSTETRICS & GYNECOLOGY

## 2025-02-24 PROCEDURE — 76815 OB US LIMITED FETUS(S): CPT

## 2025-02-24 PROCEDURE — 76817 TRANSVAGINAL US OBSTETRIC: CPT

## 2025-02-24 NOTE — PROGRESS NOTES
OB visit      Barbi Willson is a 19 y.o. year old female  @ L 24, 35 wks , REBECA 3/31/25  consistent with office 7 wk US. Complaints : nausea without vomiting for days .   History of Present Illness  The patient presents for evaluation of depression.    She reports experiencing depressive symptoms for the past few weeks, which she attributes to her recent relocation to her father's residence. She also mentions occasional feelings of anxiety. She expresses concern about potential postpartum depression, given her current emotional state. She has a history of postpartum depression, for which she was previously prescribed medication.     POB: FTLTCS , 39 wks ,  , female , 8lbs , nonre-assuring heart beats . Failed induction . Arrest at 1 cm .     PGYN: denies     PMH: obesity     PSH: LTCS     MEDS: PNV     Drug Allergies:   Allergies   Allergen Reactions    Bactrim [Sulfamethoxazole-Trimethoprim] Nausea And Vomiting    Reglan [Metoclopramide] Headaches         SOCHX: vapes , trying to quit.     FH: Mother or Father , DM No , HTN No, Other No    /70   Pulse 76   Wt 99.8 kg (220 lb)   LMP 2024 (Approximate)   BMI 42.97 kg/m²   Past Medical History:   Diagnosis Date    History of migraine headaches      Past Surgical History:   Procedure Laterality Date     SECTION N/A 2022     SECTION performed by Jelena Parikh MD at [facility] [place] L&D OR    FEMUR FRACTURE SURGERY           Review of Systems  Constitutional: negative  Genitourinary:see above  Integument/breast: negative  Behavioral/Psych: negative  Endocrine: negative     All other systems reviewed and are negative.       Physical Exam:  /70   Pulse 76   Wt 99.8 kg (220 lb)   LMP 2024 (Approximate)   BMI 42.97 kg/m²   Skin: Warm, dry, no lesions or rashes  Extremities: Without clubbing, cyanosis and edema. Palms and nails are normal. Ambulates without difficulty  Neurological: No gross sensory or motor

## 2025-03-10 ENCOUNTER — ROUTINE PRENATAL (OUTPATIENT)
Dept: OBGYN CLINIC | Age: 20
End: 2025-03-10

## 2025-03-10 VITALS
DIASTOLIC BLOOD PRESSURE: 60 MMHG | HEART RATE: 72 BPM | BODY MASS INDEX: 43.16 KG/M2 | SYSTOLIC BLOOD PRESSURE: 96 MMHG | WEIGHT: 221 LBS

## 2025-03-10 DIAGNOSIS — Z3A.37 37 WEEKS GESTATION OF PREGNANCY: ICD-10-CM

## 2025-03-10 DIAGNOSIS — Z34.82 ENCOUNTER FOR SUPERVISION OF OTHER NORMAL PREGNANCY IN SECOND TRIMESTER: Primary | ICD-10-CM

## 2025-03-10 DIAGNOSIS — O34.219 PREVIOUS CESAREAN SECTION COMPLICATING PREGNANCY: ICD-10-CM

## 2025-03-10 DIAGNOSIS — Z34.82 ENCOUNTER FOR SUPERVISION OF OTHER NORMAL PREGNANCY IN SECOND TRIMESTER: ICD-10-CM

## 2025-03-10 PROCEDURE — 0502F SUBSEQUENT PRENATAL CARE: CPT | Performed by: OBSTETRICS & GYNECOLOGY

## 2025-03-10 NOTE — PROGRESS NOTES
Chaperone for Intimate Exam    1. Was chaperone offered as part of the rooming process? offered, accepted   2. If Chaperone is declined by patient, NA: chaperone was available and exam completed  3. Chaperone is Ludy Champion LPN

## 2025-03-10 NOTE — PROGRESS NOTES
OB visit      Barbi Willson is a 19 y.o. year old female  @ L 24, 37wks , REBECA 3/31/25  consistent with office 7 wk US. Complaints : nausea without vomiting for days .   History of Present Illness  The patient presents for evaluation of depression.    She reports experiencing depressive symptoms for the past few weeks, which she attributes to her recent relocation to her father's residence. She also mentions occasional feelings of anxiety. She expresses concern about potential postpartum depression, given her current emotional state. She has a history of postpartum depression, for which she was previously prescribed medication.     POB: FTLTCS , 39 wks ,  , female , 8lbs , nonre-assuring heart beats . Failed induction . Arrest at 1 cm .     PGYN: denies     PMH: obesity     PSH: LTCS     MEDS: PNV     Drug Allergies:   Allergies   Allergen Reactions    Bactrim [Sulfamethoxazole-Trimethoprim] Nausea And Vomiting    Reglan [Metoclopramide] Headaches         SOCHX: vapes , trying to quit.     FH: Mother or Father , DM No , HTN No, Other No    BP 96/60   Pulse 72   Wt 100.2 kg (221 lb)   LMP 2024 (Approximate)   BMI 43.16 kg/m²   Past Medical History:   Diagnosis Date    History of migraine headaches      Past Surgical History:   Procedure Laterality Date     SECTION N/A 2022     SECTION performed by Jelena Parikh MD at [facility] [place]    FEMUR FRACTURE SURGERY           Review of Systems  Constitutional: negative  Genitourinary:see above  Integument/breast: negative  Behavioral/Psych: negative  Endocrine: negative     All other systems reviewed and are negative.       Physical Exam:  BP 96/60   Pulse 72   Wt 100.2 kg (221 lb)   LMP 2024 (Approximate)   BMI 43.16 kg/m²   Skin: Warm, dry, no lesions or rashes  Extremities: Without clubbing, cyanosis and edema. Palms and nails are normal. Ambulates without difficulty  Neurological: No gross sensory or motor

## 2025-03-14 LAB — GP B STREP SPEC QL CULT: NORMAL

## 2025-03-17 ENCOUNTER — ROUTINE PRENATAL (OUTPATIENT)
Dept: OBGYN CLINIC | Age: 20
End: 2025-03-17

## 2025-03-17 VITALS
SYSTOLIC BLOOD PRESSURE: 104 MMHG | BODY MASS INDEX: 43.55 KG/M2 | DIASTOLIC BLOOD PRESSURE: 68 MMHG | HEART RATE: 84 BPM | WEIGHT: 223 LBS

## 2025-03-17 DIAGNOSIS — Z34.82 ENCOUNTER FOR SUPERVISION OF OTHER NORMAL PREGNANCY IN SECOND TRIMESTER: Primary | ICD-10-CM

## 2025-03-17 DIAGNOSIS — Z3A.38 38 WEEKS GESTATION OF PREGNANCY: ICD-10-CM

## 2025-03-17 NOTE — PROGRESS NOTES
OB visit      Barbi Willson is a 19 y.o. year old female  @ L 24, 38wks , REBECA 3/31/25  consistent with office 7 wk US. Complaints : nausea without vomiting for days .   History of Present Illness  The patient presents for evaluation of depression.    She reports experiencing depressive symptoms for the past few weeks, which she attributes to her recent relocation to her father's residence. She also mentions occasional feelings of anxiety. She expresses concern about potential postpartum depression, given her current emotional state. She has a history of postpartum depression, for which she was previously prescribed medication.     POB: FTLTCS , 39 wks ,  , female , 8lbs , nonre-assuring heart beats . Failed induction . Arrest at 1 cm .     PGYN: denies     PMH: obesity     PSH: LTCS     MEDS: PNV     Drug Allergies:   Allergies   Allergen Reactions    Bactrim [Sulfamethoxazole-Trimethoprim] Nausea And Vomiting    Reglan [Metoclopramide] Headaches         SOCHX: vapes , trying to quit.     FH: Mother or Father , DM No , HTN No, Other No    /68   Pulse 84   Wt 101.2 kg (223 lb)   LMP 2024 (Approximate)   BMI 43.55 kg/m²   Past Medical History:   Diagnosis Date    History of migraine headaches      Past Surgical History:   Procedure Laterality Date     SECTION N/A 2022     SECTION performed by Jelena Parikh [place] [facility] [place]    FEMUR FRACTURE SURGERY           Review of Systems  Constitutional: negative  Genitourinary:see above  Integument/breast: negative  Behavioral/Psych: negative  Endocrine: negative     All other systems reviewed and are negative.       Physical Exam:  /68   Pulse 84   Wt 101.2 kg (223 lb)   LMP 2024 (Approximate)   BMI 43.55 kg/m²   Skin: Warm, dry, no lesions or rashes  Extremities: Without clubbing, cyanosis and edema. Palms and nails are normal. Ambulates without difficulty  Neurological: No gross sensory or motor

## 2025-03-18 ENCOUNTER — TELEPHONE (OUTPATIENT)
Dept: OBGYN CLINIC | Age: 20
End: 2025-03-18

## 2025-03-18 ENCOUNTER — HOSPITAL ENCOUNTER (OUTPATIENT)
Age: 20
Discharge: HOME | End: 2025-03-18
Attending: OBSTETRICS & GYNECOLOGY | Admitting: OBSTETRICS & GYNECOLOGY
Payer: COMMERCIAL

## 2025-03-18 VITALS
WEIGHT: 222 LBS | DIASTOLIC BLOOD PRESSURE: 59 MMHG | TEMPERATURE: 99.1 F | RESPIRATION RATE: 18 BRPM | OXYGEN SATURATION: 99 % | BODY MASS INDEX: 41.91 KG/M2 | SYSTOLIC BLOOD PRESSURE: 110 MMHG | HEART RATE: 110 BPM | HEIGHT: 61 IN

## 2025-03-18 PROBLEM — O47.9 THREATENED LABOR AT TERM: Status: ACTIVE | Noted: 2025-03-18

## 2025-03-18 LAB
AMPHET UR QL SCN: NORMAL
BACTERIA URNS QL MICRO: ABNORMAL /HPF
BARBITURATES UR QL SCN: NORMAL
BENZODIAZ UR QL SCN: NORMAL
BILIRUB UR QL STRIP: NEGATIVE
CANNABINOIDS UR QL SCN: NORMAL
CLARITY UR: CLEAR
COCAINE UR QL SCN: NORMAL
COLOR UR: YELLOW
DRUG SCREEN COMMENT UR-IMP: NORMAL
EPI CELLS #/AREA URNS AUTO: ABNORMAL /HPF (ref 0–5)
FENTANYL SCREEN, URINE: NORMAL
GLUCOSE UR STRIP-MCNC: NEGATIVE MG/DL
HGB UR QL STRIP: NEGATIVE
HYALINE CASTS #/AREA URNS AUTO: ABNORMAL /HPF (ref 0–5)
KETONES UR STRIP-MCNC: NEGATIVE MG/DL
LEUKOCYTE ESTERASE UR QL STRIP: ABNORMAL
METHADONE UR QL SCN: NORMAL
NITRITE UR QL STRIP: NEGATIVE
OPIATES UR QL SCN: NORMAL
OXYCODONE UR QL SCN: NORMAL
PCP UR QL SCN: NORMAL
PH UR STRIP: 7 [PH] (ref 5–9)
PROPOXYPH UR QL SCN: NORMAL
PROT UR STRIP-MCNC: NEGATIVE MG/DL
RBC #/AREA URNS AUTO: ABNORMAL /HPF (ref 0–5)
SP GR UR STRIP: 1.01 (ref 1–1.03)
URINE REFLEX TO CULTURE: YES
UROBILINOGEN UR STRIP-ACNC: 1 E.U./DL
WBC #/AREA URNS AUTO: ABNORMAL /HPF (ref 0–5)

## 2025-03-18 PROCEDURE — 99283 EMERGENCY DEPT VISIT LOW MDM: CPT | Performed by: OBSTETRICS & GYNECOLOGY

## 2025-03-18 PROCEDURE — 81001 URINALYSIS AUTO W/SCOPE: CPT

## 2025-03-18 PROCEDURE — 87086 URINE CULTURE/COLONY COUNT: CPT

## 2025-03-18 PROCEDURE — 80307 DRUG TEST PRSMV CHEM ANLYZR: CPT

## 2025-03-18 PROCEDURE — 59025 FETAL NON-STRESS TEST: CPT | Performed by: OBSTETRICS & GYNECOLOGY

## 2025-03-18 PROCEDURE — 99284 EMERGENCY DEPT VISIT MOD MDM: CPT

## 2025-03-18 NOTE — FLOWSHEET NOTE
AMARI performed, pt very sensitive internally states Dr Aden aware.Pt admits to eating around 1 to 1:30pm.

## 2025-03-18 NOTE — TELEPHONE ENCOUNTER
Pt called stated they're having a csection Monday, however, has concerns of symptoms having since yesterday being cramps 10-15mins apart that come with vaginal pressure and back pain. No leakage of fluid. Advised pt to go to labor and delivery for further evaluation. Pt understood.

## 2025-03-18 NOTE — ED NOTES
negative    Assessment:   19 y.o.  38w1d female with threatened labor, h/o CD x 1.     Plan:   Pt given precautions  Pt to f/u for repeat CD as scheduled    Brianna Avelar MD

## 2025-03-18 NOTE — FLOWSHEET NOTE
Pt arrived ambulatory with C/O abdominal pain starting 3/17 am then resolved, returning after intercourse at 11 pm last night. HX of a previous C/S due to CLINT. Denies vaginal bleeding, leakage of fluid. States \" I have been having Dallas Song since 20 weeks.\" UA obtained and POC discussed. Pt states she called the office and was told to come in. Pt is scheduled for a Repeat C/S on 3/24.

## 2025-03-19 LAB — BACTERIA UR CULT: NORMAL

## 2025-03-23 NOTE — PROGRESS NOTES
Pt was called & informed to arrive to the unit at 0500. Instructed to go through the ER entrance, nothing to eat or drink after midnight. Visitation rules were explained to the pt. Pt states that she understands & had no questions for us.

## 2025-03-24 ENCOUNTER — ANESTHESIA EVENT (OUTPATIENT)
Dept: LABOR AND DELIVERY | Age: 20
End: 2025-03-24
Payer: COMMERCIAL

## 2025-03-24 ENCOUNTER — HOSPITAL ENCOUNTER (INPATIENT)
Age: 20
LOS: 2 days | Discharge: HOME | End: 2025-03-26
Attending: OBSTETRICS & GYNECOLOGY | Admitting: OBSTETRICS & GYNECOLOGY
Payer: COMMERCIAL

## 2025-03-24 ENCOUNTER — ANESTHESIA (OUTPATIENT)
Dept: LABOR AND DELIVERY | Age: 20
End: 2025-03-24
Payer: COMMERCIAL

## 2025-03-24 DIAGNOSIS — Z98.891 STATUS POST REPEAT LOW TRANSVERSE CESAREAN SECTION: Primary | ICD-10-CM

## 2025-03-24 PROBLEM — Z78.9 ADMITTED TO LABOR AND DELIVERY: Status: ACTIVE | Noted: 2025-03-24

## 2025-03-24 LAB
ABO/RH: NORMAL
ALBUMIN SERPL-MCNC: 3.3 G/DL (ref 3.5–4.6)
ALP SERPL-CCNC: 203 U/L (ref 40–130)
ALT SERPL-CCNC: 9 U/L (ref 0–33)
AMPHET UR QL SCN: NORMAL
ANION GAP SERPL CALCULATED.3IONS-SCNC: 12 MEQ/L (ref 9–15)
ANTIBODY SCREEN: NORMAL
AST SERPL-CCNC: 15 U/L (ref 0–35)
BARBITURATES UR QL SCN: NORMAL
BENZODIAZ UR QL SCN: NORMAL
BILIRUB SERPL-MCNC: 0.5 MG/DL (ref 0.2–0.7)
BILIRUB UR QL STRIP: NEGATIVE
BUN SERPL-MCNC: 8 MG/DL (ref 6–20)
CALCIUM SERPL-MCNC: 8.7 MG/DL (ref 8.5–9.9)
CANNABINOIDS UR QL SCN: NORMAL
CHLORIDE SERPL-SCNC: 103 MEQ/L (ref 95–107)
CLARITY UR: ABNORMAL
CO2 SERPL-SCNC: 20 MEQ/L (ref 20–31)
COCAINE UR QL SCN: NORMAL
COLOR UR: YELLOW
CREAT SERPL-MCNC: 0.5 MG/DL (ref 0.5–0.9)
DRUG SCREEN COMMENT UR-IMP: NORMAL
ERYTHROCYTE [DISTWIDTH] IN BLOOD BY AUTOMATED COUNT: 15 % (ref 11.5–14.5)
FENTANYL SCREEN, URINE: NORMAL
GLOBULIN SER CALC-MCNC: 3.1 G/DL (ref 2.3–3.5)
GLUCOSE SERPL-MCNC: 93 MG/DL (ref 70–99)
GLUCOSE UR STRIP-MCNC: NEGATIVE MG/DL
HBV SURFACE AG SERPL QL IA: NORMAL
HCT VFR BLD AUTO: 32.2 % (ref 37–47)
HGB BLD-MCNC: 10.9 G/DL (ref 12–16)
HGB UR QL STRIP: NEGATIVE
KETONES UR STRIP-MCNC: NEGATIVE MG/DL
LEUKOCYTE ESTERASE UR QL STRIP: NEGATIVE
MCH RBC QN AUTO: 28.7 PG (ref 27–31.3)
MCHC RBC AUTO-ENTMCNC: 33.9 % (ref 33–37)
MCV RBC AUTO: 84.7 FL (ref 79.4–94.8)
METHADONE UR QL SCN: NORMAL
NITRITE UR QL STRIP: NEGATIVE
OPIATES UR QL SCN: NORMAL
OXYCODONE UR QL SCN: NORMAL
PCP UR QL SCN: NORMAL
PH UR STRIP: 7 [PH] (ref 5–9)
PLATELET # BLD AUTO: 327 K/UL (ref 130–400)
POTASSIUM SERPL-SCNC: 3.9 MEQ/L (ref 3.4–4.9)
PROPOXYPH UR QL SCN: NORMAL
PROT SERPL-MCNC: 6.4 G/DL (ref 6.3–8)
PROT UR STRIP-MCNC: NEGATIVE MG/DL
RBC # BLD AUTO: 3.8 M/UL (ref 4.2–5.4)
SODIUM SERPL-SCNC: 135 MEQ/L (ref 135–144)
SP GR UR STRIP: 1.02 (ref 1–1.03)
URINE REFLEX TO CULTURE: ABNORMAL
UROBILINOGEN UR STRIP-ACNC: 1 E.U./DL
WBC # BLD AUTO: 9.5 K/UL (ref 4.5–11)

## 2025-03-24 PROCEDURE — 81003 URINALYSIS AUTO W/O SCOPE: CPT

## 2025-03-24 PROCEDURE — 3609079900 HC CESAREAN SECTION: Performed by: OBSTETRICS & GYNECOLOGY

## 2025-03-24 PROCEDURE — 3700000000 HC ANESTHESIA ATTENDED CARE: Performed by: OBSTETRICS & GYNECOLOGY

## 2025-03-24 PROCEDURE — 7100000000 HC PACU RECOVERY - FIRST 15 MIN: Performed by: OBSTETRICS & GYNECOLOGY

## 2025-03-24 PROCEDURE — 2709999900 HC NON-CHARGEABLE SUPPLY: Performed by: OBSTETRICS & GYNECOLOGY

## 2025-03-24 PROCEDURE — 87340 HEPATITIS B SURFACE AG IA: CPT

## 2025-03-24 PROCEDURE — 1220000000 HC SEMI PRIVATE OB R&B

## 2025-03-24 PROCEDURE — 3700000001 HC ADD 15 MINUTES (ANESTHESIA): Performed by: OBSTETRICS & GYNECOLOGY

## 2025-03-24 PROCEDURE — 6360000002 HC RX W HCPCS: Performed by: NURSE ANESTHETIST, CERTIFIED REGISTERED

## 2025-03-24 PROCEDURE — 2580000003 HC RX 258: Performed by: OBSTETRICS & GYNECOLOGY

## 2025-03-24 PROCEDURE — 2500000003 HC RX 250 WO HCPCS: Performed by: OBSTETRICS & GYNECOLOGY

## 2025-03-24 PROCEDURE — 6370000000 HC RX 637 (ALT 250 FOR IP): Performed by: OBSTETRICS & GYNECOLOGY

## 2025-03-24 PROCEDURE — 59510 CESAREAN DELIVERY: CPT | Performed by: OBSTETRICS & GYNECOLOGY

## 2025-03-24 PROCEDURE — 80053 COMPREHEN METABOLIC PANEL: CPT

## 2025-03-24 PROCEDURE — 86900 BLOOD TYPING SEROLOGIC ABO: CPT

## 2025-03-24 PROCEDURE — 2500000003 HC RX 250 WO HCPCS: Performed by: NURSE ANESTHETIST, CERTIFIED REGISTERED

## 2025-03-24 PROCEDURE — 6360000002 HC RX W HCPCS: Performed by: OBSTETRICS & GYNECOLOGY

## 2025-03-24 PROCEDURE — 80307 DRUG TEST PRSMV CHEM ANLYZR: CPT

## 2025-03-24 PROCEDURE — 85027 COMPLETE CBC AUTOMATED: CPT

## 2025-03-24 PROCEDURE — 2580000003 HC RX 258: Performed by: NURSE ANESTHETIST, CERTIFIED REGISTERED

## 2025-03-24 PROCEDURE — 7100000001 HC PACU RECOVERY - ADDTL 15 MIN: Performed by: OBSTETRICS & GYNECOLOGY

## 2025-03-24 PROCEDURE — 86850 RBC ANTIBODY SCREEN: CPT

## 2025-03-24 PROCEDURE — 86901 BLOOD TYPING SEROLOGIC RH(D): CPT

## 2025-03-24 RX ORDER — PRENATAL VIT/IRON FUM/FOLIC AC 27MG-0.8MG
1 TABLET ORAL DAILY
Status: DISCONTINUED | OUTPATIENT
Start: 2025-03-24 | End: 2025-03-26 | Stop reason: HOSPADM

## 2025-03-24 RX ORDER — FENTANYL CITRATE 50 UG/ML
INJECTION, SOLUTION INTRAMUSCULAR; INTRAVENOUS
Status: DISCONTINUED | OUTPATIENT
Start: 2025-03-24 | End: 2025-03-24 | Stop reason: SDUPTHER

## 2025-03-24 RX ORDER — DIPHENHYDRAMINE HYDROCHLORIDE 50 MG/ML
INJECTION, SOLUTION INTRAMUSCULAR; INTRAVENOUS
Status: DISCONTINUED | OUTPATIENT
Start: 2025-03-24 | End: 2025-03-24 | Stop reason: SDUPTHER

## 2025-03-24 RX ORDER — MISOPROSTOL 200 UG/1
TABLET ORAL
Status: DISCONTINUED
Start: 2025-03-24 | End: 2025-03-24 | Stop reason: WASHOUT

## 2025-03-24 RX ORDER — SODIUM CHLORIDE, SODIUM LACTATE, POTASSIUM CHLORIDE, AND CALCIUM CHLORIDE .6; .31; .03; .02 G/100ML; G/100ML; G/100ML; G/100ML
1000 INJECTION, SOLUTION INTRAVENOUS ONCE
Status: COMPLETED | OUTPATIENT
Start: 2025-03-24 | End: 2025-03-24

## 2025-03-24 RX ORDER — ACETAMINOPHEN 500 MG
1000 TABLET ORAL EVERY 8 HOURS SCHEDULED
Status: DISCONTINUED | OUTPATIENT
Start: 2025-03-24 | End: 2025-03-26 | Stop reason: HOSPADM

## 2025-03-24 RX ORDER — CARBOPROST TROMETHAMINE 250 UG/ML
INJECTION, SOLUTION INTRAMUSCULAR
Status: DISCONTINUED
Start: 2025-03-24 | End: 2025-03-24 | Stop reason: WASHOUT

## 2025-03-24 RX ORDER — FERROUS SULFATE 325(65) MG
325 TABLET ORAL 2 TIMES DAILY WITH MEALS
Status: DISCONTINUED | OUTPATIENT
Start: 2025-03-24 | End: 2025-03-24

## 2025-03-24 RX ORDER — SODIUM CHLORIDE 0.9 % (FLUSH) 0.9 %
5-40 SYRINGE (ML) INJECTION EVERY 12 HOURS SCHEDULED
Status: DISCONTINUED | OUTPATIENT
Start: 2025-03-24 | End: 2025-03-26 | Stop reason: HOSPADM

## 2025-03-24 RX ORDER — SODIUM CHLORIDE 0.9 % (FLUSH) 0.9 %
5-40 SYRINGE (ML) INJECTION EVERY 12 HOURS SCHEDULED
Status: DISCONTINUED | OUTPATIENT
Start: 2025-03-24 | End: 2025-03-24

## 2025-03-24 RX ORDER — ONDANSETRON 4 MG/1
4 TABLET, ORALLY DISINTEGRATING ORAL EVERY 8 HOURS PRN
Status: DISCONTINUED | OUTPATIENT
Start: 2025-03-24 | End: 2025-03-26 | Stop reason: HOSPADM

## 2025-03-24 RX ORDER — BUTALBITAL, ACETAMINOPHEN AND CAFFEINE 50; 325; 40 MG/1; MG/1; MG/1
2 TABLET ORAL EVERY 6 HOURS PRN
Status: DISCONTINUED | OUTPATIENT
Start: 2025-03-24 | End: 2025-03-24

## 2025-03-24 RX ORDER — DIPHENHYDRAMINE HYDROCHLORIDE 50 MG/ML
25 INJECTION, SOLUTION INTRAMUSCULAR; INTRAVENOUS EVERY 6 HOURS PRN
Status: DISCONTINUED | OUTPATIENT
Start: 2025-03-24 | End: 2025-03-26 | Stop reason: HOSPADM

## 2025-03-24 RX ORDER — SIMETHICONE 80 MG
80 TABLET,CHEWABLE ORAL EVERY 6 HOURS PRN
Status: DISCONTINUED | OUTPATIENT
Start: 2025-03-24 | End: 2025-03-26 | Stop reason: HOSPADM

## 2025-03-24 RX ORDER — SODIUM CHLORIDE 9 MG/ML
INJECTION, SOLUTION INTRAVENOUS PRN
Status: DISCONTINUED | OUTPATIENT
Start: 2025-03-24 | End: 2025-03-24

## 2025-03-24 RX ORDER — DOCUSATE SODIUM 100 MG/1
100 CAPSULE, LIQUID FILLED ORAL 2 TIMES DAILY
Status: DISCONTINUED | OUTPATIENT
Start: 2025-03-24 | End: 2025-03-26 | Stop reason: HOSPADM

## 2025-03-24 RX ORDER — METHYLERGONOVINE MALEATE 0.2 MG/ML
INJECTION INTRAVENOUS
Status: DISCONTINUED
Start: 2025-03-24 | End: 2025-03-24 | Stop reason: WASHOUT

## 2025-03-24 RX ORDER — SODIUM CHLORIDE, SODIUM LACTATE, POTASSIUM CHLORIDE, CALCIUM CHLORIDE 600; 310; 30; 20 MG/100ML; MG/100ML; MG/100ML; MG/100ML
INJECTION, SOLUTION INTRAVENOUS CONTINUOUS
Status: DISCONTINUED | OUTPATIENT
Start: 2025-03-24 | End: 2025-03-24

## 2025-03-24 RX ORDER — SODIUM CHLORIDE 9 MG/ML
INJECTION, SOLUTION INTRAVENOUS
Status: DISCONTINUED
Start: 2025-03-24 | End: 2025-03-24 | Stop reason: WASHOUT

## 2025-03-24 RX ORDER — SODIUM CHLORIDE, SODIUM LACTATE, POTASSIUM CHLORIDE, CALCIUM CHLORIDE 600; 310; 30; 20 MG/100ML; MG/100ML; MG/100ML; MG/100ML
INJECTION, SOLUTION INTRAVENOUS CONTINUOUS
Status: DISCONTINUED | OUTPATIENT
Start: 2025-03-24 | End: 2025-03-26 | Stop reason: HOSPADM

## 2025-03-24 RX ORDER — MODIFIED LANOLIN
OINTMENT (GRAM) TOPICAL
Status: DISCONTINUED | OUTPATIENT
Start: 2025-03-24 | End: 2025-03-26 | Stop reason: HOSPADM

## 2025-03-24 RX ORDER — METHYLERGONOVINE MALEATE 0.2 MG/ML
200 INJECTION INTRAVENOUS PRN
Status: DISCONTINUED | OUTPATIENT
Start: 2025-03-24 | End: 2025-03-26 | Stop reason: HOSPADM

## 2025-03-24 RX ORDER — SODIUM CHLORIDE 9 MG/ML
INJECTION, SOLUTION INTRAVENOUS PRN
Status: DISCONTINUED | OUTPATIENT
Start: 2025-03-24 | End: 2025-03-26 | Stop reason: HOSPADM

## 2025-03-24 RX ORDER — SODIUM CHLORIDE 0.9 % (FLUSH) 0.9 %
10 SYRINGE (ML) INJECTION PRN
Status: DISCONTINUED | OUTPATIENT
Start: 2025-03-24 | End: 2025-03-24

## 2025-03-24 RX ORDER — SODIUM CHLORIDE, SODIUM LACTATE, POTASSIUM CHLORIDE, CALCIUM CHLORIDE 600; 310; 30; 20 MG/100ML; MG/100ML; MG/100ML; MG/100ML
INJECTION, SOLUTION INTRAVENOUS
Status: DISCONTINUED | OUTPATIENT
Start: 2025-03-24 | End: 2025-03-24 | Stop reason: SDUPTHER

## 2025-03-24 RX ORDER — SODIUM CHLORIDE 0.9 % (FLUSH) 0.9 %
5-40 SYRINGE (ML) INJECTION PRN
Status: DISCONTINUED | OUTPATIENT
Start: 2025-03-24 | End: 2025-03-26 | Stop reason: HOSPADM

## 2025-03-24 RX ORDER — ONDANSETRON 2 MG/ML
INJECTION INTRAMUSCULAR; INTRAVENOUS
Status: DISCONTINUED | OUTPATIENT
Start: 2025-03-24 | End: 2025-03-24 | Stop reason: SDUPTHER

## 2025-03-24 RX ORDER — ACETAMINOPHEN 325 MG/1
975 TABLET ORAL ONCE
Status: DISCONTINUED | OUTPATIENT
Start: 2025-03-24 | End: 2025-03-24

## 2025-03-24 RX ORDER — KETOROLAC TROMETHAMINE 30 MG/ML
30 INJECTION, SOLUTION INTRAMUSCULAR; INTRAVENOUS EVERY 6 HOURS
Status: COMPLETED | OUTPATIENT
Start: 2025-03-24 | End: 2025-03-25

## 2025-03-24 RX ORDER — KETOROLAC TROMETHAMINE 30 MG/ML
INJECTION, SOLUTION INTRAMUSCULAR; INTRAVENOUS
Status: DISCONTINUED | OUTPATIENT
Start: 2025-03-24 | End: 2025-03-24 | Stop reason: SDUPTHER

## 2025-03-24 RX ORDER — TRANEXAMIC ACID 100 MG/ML
INJECTION, SOLUTION INTRAVENOUS
Status: DISCONTINUED
Start: 2025-03-24 | End: 2025-03-24 | Stop reason: WASHOUT

## 2025-03-24 RX ORDER — IBUPROFEN 800 MG/1
800 TABLET, FILM COATED ORAL EVERY 8 HOURS
Status: DISCONTINUED | OUTPATIENT
Start: 2025-03-25 | End: 2025-03-26 | Stop reason: HOSPADM

## 2025-03-24 RX ORDER — EPHEDRINE SULFATE/0.9% NACL/PF 25 MG/5 ML
SYRINGE (ML) INTRAVENOUS
Status: DISCONTINUED | OUTPATIENT
Start: 2025-03-24 | End: 2025-03-24 | Stop reason: SDUPTHER

## 2025-03-24 RX ORDER — OXYCODONE HYDROCHLORIDE 5 MG/1
5 TABLET ORAL EVERY 4 HOURS PRN
Status: DISCONTINUED | OUTPATIENT
Start: 2025-03-24 | End: 2025-03-26 | Stop reason: HOSPADM

## 2025-03-24 RX ORDER — ONDANSETRON 2 MG/ML
4 INJECTION INTRAMUSCULAR; INTRAVENOUS EVERY 6 HOURS PRN
Status: DISCONTINUED | OUTPATIENT
Start: 2025-03-24 | End: 2025-03-26 | Stop reason: HOSPADM

## 2025-03-24 RX ORDER — SCOPOLAMINE 1 MG/3D
1 PATCH, EXTENDED RELEASE TRANSDERMAL
Status: DISCONTINUED | OUTPATIENT
Start: 2025-03-24 | End: 2025-03-24

## 2025-03-24 RX ORDER — OXYCODONE HYDROCHLORIDE 5 MG/1
10 TABLET ORAL EVERY 4 HOURS PRN
Status: DISCONTINUED | OUTPATIENT
Start: 2025-03-24 | End: 2025-03-26 | Stop reason: HOSPADM

## 2025-03-24 RX ORDER — FERROUS SULFATE 325(65) MG
325 TABLET ORAL 2 TIMES DAILY WITH MEALS
Status: DISCONTINUED | OUTPATIENT
Start: 2025-03-25 | End: 2025-03-26 | Stop reason: HOSPADM

## 2025-03-24 RX ORDER — BUPIVACAINE HYDROCHLORIDE 7.5 MG/ML
INJECTION, SOLUTION INTRASPINAL
Status: DISCONTINUED | OUTPATIENT
Start: 2025-03-24 | End: 2025-03-24 | Stop reason: SDUPTHER

## 2025-03-24 RX ORDER — BISACODYL 10 MG
10 SUPPOSITORY, RECTAL RECTAL DAILY PRN
Status: DISCONTINUED | OUTPATIENT
Start: 2025-03-24 | End: 2025-03-26 | Stop reason: HOSPADM

## 2025-03-24 RX ORDER — OXYTOCIN 10 [USP'U]/ML
INJECTION, SOLUTION INTRAMUSCULAR; INTRAVENOUS
Status: DISCONTINUED
Start: 2025-03-24 | End: 2025-03-24 | Stop reason: WASHOUT

## 2025-03-24 RX ORDER — ONDANSETRON 2 MG/ML
4 INJECTION INTRAMUSCULAR; INTRAVENOUS EVERY 6 HOURS PRN
Status: DISCONTINUED | OUTPATIENT
Start: 2025-03-24 | End: 2025-03-24

## 2025-03-24 RX ORDER — DEXAMETHASONE SODIUM PHOSPHATE 10 MG/ML
INJECTION, SOLUTION INTRAMUSCULAR; INTRAVENOUS
Status: DISCONTINUED | OUTPATIENT
Start: 2025-03-24 | End: 2025-03-24 | Stop reason: SDUPTHER

## 2025-03-24 RX ORDER — ESCITALOPRAM OXALATE 10 MG/1
10 TABLET ORAL DAILY
Status: DISCONTINUED | OUTPATIENT
Start: 2025-03-25 | End: 2025-03-26 | Stop reason: HOSPADM

## 2025-03-24 RX ADMIN — SODIUM CHLORIDE, SODIUM LACTATE, POTASSIUM CHLORIDE, AND CALCIUM CHLORIDE 1000 ML: .6; .31; .03; .02 INJECTION, SOLUTION INTRAVENOUS at 05:54

## 2025-03-24 RX ADMIN — SODIUM CHLORIDE 200 MG: 9 INJECTION, SOLUTION INTRAVENOUS at 14:17

## 2025-03-24 RX ADMIN — CEFOXITIN 1000 MG: 1 INJECTION, POWDER, FOR SOLUTION INTRAVENOUS at 13:13

## 2025-03-24 RX ADMIN — KETOROLAC TROMETHAMINE 30 MG: 30 INJECTION, SOLUTION INTRAMUSCULAR at 09:08

## 2025-03-24 RX ADMIN — PHENYLEPHRINE HYDROCHLORIDE 100 MCG: 10 INJECTION INTRAVENOUS at 09:08

## 2025-03-24 RX ADMIN — PHENYLEPHRINE HYDROCHLORIDE 100 MCG: 10 INJECTION INTRAVENOUS at 08:58

## 2025-03-24 RX ADMIN — BUPIVACAINE HYDROCHLORIDE IN DEXTROSE 1.6 ML: 7.5 INJECTION, SOLUTION SUBARACHNOID at 08:46

## 2025-03-24 RX ADMIN — KETOROLAC TROMETHAMINE 30 MG: 30 INJECTION, SOLUTION INTRAMUSCULAR at 21:49

## 2025-03-24 RX ADMIN — ACETAMINOPHEN 1000 MG: 500 TABLET ORAL at 21:49

## 2025-03-24 RX ADMIN — CEFAZOLIN 2000 MG: 2 INJECTION, POWDER, FOR SOLUTION INTRAMUSCULAR; INTRAVENOUS at 08:47

## 2025-03-24 RX ADMIN — OXYCODONE 10 MG: 5 TABLET ORAL at 11:24

## 2025-03-24 RX ADMIN — PHENYLEPHRINE HYDROCHLORIDE 100 MCG: 10 INJECTION INTRAVENOUS at 09:13

## 2025-03-24 RX ADMIN — Medication: at 10:41

## 2025-03-24 RX ADMIN — CEFOXITIN 1000 MG: 1 INJECTION, POWDER, FOR SOLUTION INTRAVENOUS at 19:55

## 2025-03-24 RX ADMIN — SODIUM CHLORIDE, PRESERVATIVE FREE 10 ML: 5 INJECTION INTRAVENOUS at 21:50

## 2025-03-24 RX ADMIN — ONDANSETRON 4 MG: 2 INJECTION, SOLUTION INTRAMUSCULAR; INTRAVENOUS at 08:04

## 2025-03-24 RX ADMIN — EPHEDRINE SULFATE 10 MG: 5 INJECTION INTRAVENOUS at 08:53

## 2025-03-24 RX ADMIN — PHENYLEPHRINE HYDROCHLORIDE 100 MCG: 10 INJECTION INTRAVENOUS at 09:23

## 2025-03-24 RX ADMIN — SODIUM CHLORIDE, SODIUM LACTATE, POTASSIUM CHLORIDE, AND CALCIUM CHLORIDE: .6; .31; .03; .02 INJECTION, SOLUTION INTRAVENOUS at 07:01

## 2025-03-24 RX ADMIN — OXYCODONE 10 MG: 5 TABLET ORAL at 19:15

## 2025-03-24 RX ADMIN — PHENYLEPHRINE HYDROCHLORIDE 100 MCG: 10 INJECTION INTRAVENOUS at 08:48

## 2025-03-24 RX ADMIN — EPHEDRINE SULFATE 5 MG: 5 INJECTION INTRAVENOUS at 08:55

## 2025-03-24 RX ADMIN — PHENYLEPHRINE HYDROCHLORIDE 100 MCG: 10 INJECTION INTRAVENOUS at 09:20

## 2025-03-24 RX ADMIN — ONDANSETRON 4 MG: 2 INJECTION, SOLUTION INTRAMUSCULAR; INTRAVENOUS at 09:02

## 2025-03-24 RX ADMIN — SODIUM CHLORIDE, SODIUM LACTATE, POTASSIUM CHLORIDE, AND CALCIUM CHLORIDE: .6; .31; .03; .02 INJECTION, SOLUTION INTRAVENOUS at 08:40

## 2025-03-24 RX ADMIN — Medication 999 ML/HR: at 09:01

## 2025-03-24 RX ADMIN — DEXAMETHASONE SODIUM PHOSPHATE 10 MG: 10 INJECTION INTRAMUSCULAR; INTRAVENOUS at 09:02

## 2025-03-24 RX ADMIN — DIPHENHYDRAMINE HYDROCHLORIDE 12.5 MG: 50 INJECTION INTRAMUSCULAR; INTRAVENOUS at 09:08

## 2025-03-24 RX ADMIN — OXYCODONE 10 MG: 5 TABLET ORAL at 23:47

## 2025-03-24 RX ADMIN — FAMOTIDINE 20 MG: 10 INJECTION, SOLUTION INTRAVENOUS at 05:57

## 2025-03-24 RX ADMIN — FENTANYL CITRATE 20 MCG: 50 INJECTION, SOLUTION INTRAMUSCULAR; INTRAVENOUS at 08:46

## 2025-03-24 RX ADMIN — DOCUSATE SODIUM 100 MG: 100 CAPSULE, LIQUID FILLED ORAL at 21:49

## 2025-03-24 RX ADMIN — DOCUSATE SODIUM 100 MG: 100 CAPSULE, LIQUID FILLED ORAL at 10:42

## 2025-03-24 RX ADMIN — ACETAMINOPHEN 1000 MG: 500 TABLET ORAL at 14:17

## 2025-03-24 RX ADMIN — EPHEDRINE SULFATE 10 MG: 5 INJECTION INTRAVENOUS at 08:49

## 2025-03-24 RX ADMIN — PRENATAL VIT W/ FE FUMARATE-FA TAB 27-0.8 MG 1 TABLET: 27-0.8 TAB at 10:42

## 2025-03-24 RX ADMIN — KETOROLAC TROMETHAMINE 30 MG: 30 INJECTION, SOLUTION INTRAMUSCULAR at 15:51

## 2025-03-24 RX ADMIN — SODIUM CHLORIDE, SODIUM LACTATE, POTASSIUM CHLORIDE, AND CALCIUM CHLORIDE: .6; .31; .03; .02 INJECTION, SOLUTION INTRAVENOUS at 08:10

## 2025-03-24 ASSESSMENT — PAIN SCALES - GENERAL
PAINLEVEL_OUTOF10: 2
PAINLEVEL_OUTOF10: 8
PAINLEVEL_OUTOF10: 4
PAINLEVEL_OUTOF10: 7
PAINLEVEL_OUTOF10: 2
PAINLEVEL_OUTOF10: 5
PAINLEVEL_OUTOF10: 2

## 2025-03-24 ASSESSMENT — PAIN DESCRIPTION - DESCRIPTORS
DESCRIPTORS: ACHING;CRAMPING;DISCOMFORT
DESCRIPTORS: ACHING;CRAMPING;DISCOMFORT
DESCRIPTORS: CRAMPING

## 2025-03-24 ASSESSMENT — PAIN DESCRIPTION - LOCATION
LOCATION: ABDOMEN
LOCATION: ABDOMEN;INCISION
LOCATION: ABDOMEN;INCISION

## 2025-03-24 ASSESSMENT — PAIN DESCRIPTION - ORIENTATION
ORIENTATION: LOWER

## 2025-03-24 NOTE — LACTATION NOTE
This note was copied from a baby's chart.  -initial lactation visit  -mom reports 1st feed went well  -has prior BF experience  -provided with written breastfeeding education materials and reviewed  -baby showing active feeding cues  -assisted in latching infant to left breast in cradle hold  -deep latch and rhythmic suck noted  -reviewed importance of proper positioning/body alignment for optimal milk transfer  -mom verbalized understanding of teaching  -offered support and encouragement  -recommend frequent skin to skin, breastfeeding per hunger cues  -contact LC for assistance as needed    1400-follow up  -baby at breast upon entering room  -mom asking about donor milk, citing concerns re: baby's intake  -2 voids thus far  -reinforced previous education re: diaper counts and weight monitoring  -reviewed baby's stomach volumes, colostrum and breastmilk supply/demand  -offered reassurance and encouraged to continue frequent skin to skin, breastfeeding on demand  -will revisit tomorrow

## 2025-03-24 NOTE — ANESTHESIA PROCEDURE NOTES
Spinal Block    End time: 3/24/2025 8:46 AM  Reason for block: primary anesthetic  Staffing  Performed: resident/CRNA   Resident/CRNA: Ellie Barnes APRN - CRNA  Performed by: Ellie Barnes APRN - CRNA  Authorized by: Ellie Barnes APRN - CRNA    Spinal Block  Patient position: sitting  Prep: ChloraPrep and site prepped and draped  Patient monitoring: cardiac monitor, continuous pulse ox and frequent blood pressure checks  Approach: midline  Location: L4/L5  Provider prep: mask and sterile gloves  Needle  Needle type: Pencan   Needle gauge: 24 G  Needle length: 3.5 in  Assessment  Sensory level: T6  Swirl obtained: Yes  CSF: clear  Attempts: 1  Hemodynamics: stable  Additional Notes  Negative heme, negative parasthesia  Preanesthetic Checklist  Completed: patient identified, IV checked, site marked, risks and benefits discussed, surgical/procedural consents, equipment checked, pre-op evaluation, timeout performed, anesthesia consent given, oxygen available, monitors applied/VS acknowledged, fire risk safety assessment completed and verbalized and blood product R/B/A discussed and consented

## 2025-03-24 NOTE — PROGRESS NOTES
Call made to Dr. Willson to confirm orders received PO iron now, IV iron after pitocin is completed. She will put in pain medication orders.

## 2025-03-24 NOTE — OP NOTE
Pt Name: Barbi Willson  MRN: [id number] Acct #: [id number]  YOB: 2005  Procedure Performed By: Jelena Parikh MD     Indications: scheduled repeat LTCS at 39 weeks     Pre-operative Diagnosis: 39 week pregnancy.  Post-operative Diagnosis:  Same, Delivered, Living newbornMale  Procedure:  repeat low transverse  section  Findings:  Normal tubes, ovaries and uterus. Baby Male, Apgars  9 at 1 and 9 at 5  and weight of 7 lbs and 15 ounces.    Estimated Blood Loss:  300ml         Specimens: None     Complications:  None         Condition: infant stable to general nursery and mother stable  Assistant : Tobin Willson MD assisted in the proper positioning, prepping, and draping of the patient, intraoperative retraction and suctioning for visualization, passing sutures and suture management.      Indications:     Barbi Willson is a 19 y.o. female  at 39w0d who presented for   section : as above.  She understood the  risks and benefits and signed informed consent.     Procedure:  The patient was taken to the Operating Room where spinal anesthesia was placed.  She was placed in the dorsal supine position with a leftward tilt and prepped and draped.A santana catheter was inserted into the bladder in a sterile manner and was draining clear urine.A transverse suprapubic incision was made and the abdomen was opened with a Pfannenstiel incision.     The peritoneum identified and entered bluntly.   This incision extended superiorly and inferior with good visualization of the bladder.  The vesicouterine peritoneum was identified and incised.  This incision extended laterally and the bladder flap created digitally. The myometrium was scored and then entered bluntly.  The uterus was incised in a low transverse fashion and extended digitally.  The vertex was removed from the uterus manually and with fundal pressure.  The nose and mouth were suctioned.

## 2025-03-24 NOTE — PLAN OF CARE
Problem: Vaginal Birth or  Section  Goal: Fetal and maternal status remain reassuring during the birth process  Description:  Birth OB-Pregnancy care plan goal which identifies if the fetal and maternal status remain reassuring during the birth process  3/24/2025 1019 by Shakila Elias RN  Outcome: Completed  3/24/2025 0512 by Coty Gutiérrez RN  Outcome: Progressing     Problem: Pain  Goal: Verbalizes/displays adequate comfort level or baseline comfort level  3/24/2025 1019 by Shakila Elias, RN  Outcome: Completed  3/24/2025 0512 by Coty Gutiérrez RN  Outcome: Progressing     Problem: Infection - Adult  Goal: Absence of infection at discharge  3/24/2025 1019 by Shakila Elias RN  Outcome: Completed  3/24/2025 0512 by Coty Gutiérrez RN  Outcome: Progressing  Goal: Absence of infection during hospitalization  3/24/2025 1019 by Shakila Elias RN  Outcome: Completed  3/24/2025 0512 by Coty Gutiérrez RN  Outcome: Progressing  Goal: Absence of fever/infection during anticipated neutropenic period  3/24/2025 1019 by Shakila Elias RN  Outcome: Completed  3/24/2025 0512 by Coty Gutiérrez RN  Outcome: Progressing     Problem: Safety - Adult  Goal: Free from fall injury  3/24/2025 1019 by Shakila Elias RN  Outcome: Completed  3/24/2025 0512 by Coty Gutiérrez RN  Outcome: Progressing     Problem: Discharge Planning  Goal: Discharge to home or other facility with appropriate resources  3/24/2025 1019 by Shakila Elias, RN  Outcome: Completed  3/24/2025 0512 by Coty Gutiérrez RN  Outcome: Progressing     Problem: Chronic Conditions and Co-morbidities  Goal: Patient's chronic conditions and co-morbidity symptoms are monitored and maintained or improved  3/24/2025 1019 by Shakila Elias RN  Outcome: Completed  3/24/2025 0512 by Coty Gutiérrez RN  Outcome: Progressing

## 2025-03-24 NOTE — H&P
Final    Propoxyphene Scrn, Ur 2025 Neg  Negative <300 ng/mL Final    Oxycodone Urine 2025 Neg  Negative <100 ng/mL Final    FENTANYL SCREEN, URINE 2025 Neg  Negative < 50 ng/mL Final    Drug Screen Comment: 2025 see below   Final    Comment: This method is a screening test to detect only these drug  classes as part of a medical workup.  Confirmatory testing  by another method should be ordered if clinically indicated.           ASSESSMENT:    The patient is a 19 y.o. female  39w0d with :     Scheduled repeat LTCS   RH negative - rhogam postpartum to administer .     Active Hospital Problems    Diagnosis Date Noted    Admitted to labor and delivery [Z78.9] 2025         Orders Placed This Encounter   Procedures    CBC    DRUG SCREEN MULTI URINE    Urinalysis with Reflex to Culture    Hepatitis B Surface Antigen    Comprehensive Metabolic Panel    Diet NPO    Vital signs    Notify physician for    Up as tolerated    Monitor and record Fetal Heart rate until prepped if laboring    Insert Nowak catheter    Clip abdominal/pubic hair at operative site    Abdominal prep    Abdominal Cleansing    Monitor fetal heart rate (external)    External uterine contraction monitoring    Verify informed consent    Verify pre-procedure history and physical completed    Procedure Consent    Place intermittent pneumatic compression device    Full Code    Inpatient consult to Lactation    Nonrebreather mask oxygen    Pulse Oximetry Spot Check    TYPE AND SCREEN    Insert peripheral IV    Admit to L&D     Orders Placed This Encounter   Medications    lactated ringers infusion    lactated ringers bolus 1,000 mL    sodium chloride flush 0.9 % injection 5-40 mL    sodium chloride flush 0.9 % injection 10 mL    0.9 % sodium chloride infusion    famotidine (PEPCID) 20 MG/2ML 20 mg in sodium chloride (PF) 0.9 % 10 mL injection    acetaminophen (TYLENOL) tablet 975 mg    AND Linked Order Group     oxytocin

## 2025-03-24 NOTE — ANESTHESIA POSTPROCEDURE EVALUATION
Department of Anesthesiology  Postprocedure Note    Patient: Barbi Willson  MRN: [id number]  YOB: 2005  Date of evaluation: 3/24/2025    Procedure Summary       Date: 25 Room / Location: [address]    Anesthesia Start: 840 Anesthesia Stop: 935    Procedure:  SECTION Diagnosis:       Admitted to labor and delivery      (Admitted to labor and delivery [Z78.9])    Surgeons: Jelena Parikh MD Responsible Provider: Ellie Barnes APRN - CRNA    Anesthesia Type: spinal ASA Status: 2            Anesthesia Type: No value filed.    Eloina Phase I:      Eloina Phase II:      Anesthesia Post Evaluation    Patient location during evaluation: bedside  Patient participation: complete - patient participated  Level of consciousness: awake and alert  Pain score: 0  Airway patency: patent  Nausea & Vomiting: no nausea and no vomiting  Cardiovascular status: blood pressure returned to baseline and hemodynamically stable  Respiratory status: acceptable, nonlabored ventilation, room air and spontaneous ventilation  Hydration status: euvolemic  Multimodal analgesia pain management approach  Pain management: adequate        No notable events documented.

## 2025-03-24 NOTE — PLAN OF CARE
Problem: Postpartum  Goal: Experiences normal postpartum course  Description:  Postpartum OB-Pregnancy care plan goal which identifies if the mother is experiencing a normal postpartum course  3/24/2025 1023 by Magdalena Mendoza RN  Outcome: Progressing  3/24/2025 102 by Shakila Elias RN  Outcome: Progressing  Goal: Appropriate maternal -  bonding  Description:  Postpartum OB-Pregnancy care plan goal which identifies if the mother and  are bonding appropriately  3/24/2025 1023 by Magdalena Mendoza RN  Outcome: Progressing  3/24/2025 1021 by Shakila Elias RN  Outcome: Progressing  Goal: Establishment of infant feeding pattern  Description:  Postpartum OB-Pregnancy care plan goal which identifies if the mother is establishing a feeding pattern with their   3/24/2025 1023 by Magdalena Mendoza RN  Outcome: Progressing  3/24/2025 1021 by Shakila Elias RN  Outcome: Progressing  Goal: Incisions, wounds, or drain sites healing without S/S of infection  3/24/2025 1023 by Magdalena Mendoza RN  Outcome: Progressing  3/24/2025 1021 by Shakila Elias RN  Outcome: Progressing     Problem: Pain  Goal: Verbalizes/displays adequate comfort level or baseline comfort level  3/24/2025 1023 by Magdalena Mendoza RN  Outcome: Progressing  3/24/2025 1021 by Shakila Elias RN  Outcome: Progressing     Problem: Infection - Adult  Goal: Absence of infection at discharge  3/24/2025 1023 by Magdalena Mendoza RN  Outcome: Progressing  3/24/2025 1021 by Shakila Elias RN  Outcome: Progressing  Goal: Absence of infection during hospitalization  3/24/2025 1023 by Magdalena Mendoza RN  Outcome: Progressing  3/24/2025 1021 by Shakila Elias, RN  Outcome: Progressing  Goal: Absence of fever/infection during anticipated neutropenic period  3/24/2025 1023 by Magdalena Mendoza RN  Outcome: Progressing  3/24/2025 1021 by Shakila Elias RN  Outcome: Progressing     Problem: Safety - Adult  Goal: Free from fall

## 2025-03-24 NOTE — ANESTHESIA PRE PROCEDURE
(If Applicable):  Lab Results   Component Value Date/Time    HEPCAB NONREACTIVE 07/20/2024 08:35 AM       COVID-19 Screening (If Applicable):   Lab Results   Component Value Date/Time    COVID19 Not Detected 09/21/2022 11:50 PM           Anesthesia Evaluation  Patient summary reviewed and Nursing notes reviewed  Airway: Mallampati: II  TM distance: >3 FB   Neck ROM: full  Mouth opening: > = 3 FB   Dental: normal exam         Pulmonary:                              Cardiovascular:                      Neuro/Psych:               GI/Hepatic/Renal:             Endo/Other:                     Abdominal:             Vascular:          Other Findings:             Anesthesia Plan      spinal     ASA 2             Anesthetic plan and risks discussed with patient.    Use of blood products discussed with patient whom consented to blood products.                      Ellie Barnes, APRN - CRNA   3/24/2025

## 2025-03-25 LAB
ALBUMIN SERPL-MCNC: 2.9 G/DL (ref 3.5–4.6)
ALP SERPL-CCNC: 171 U/L (ref 40–130)
ALT SERPL-CCNC: 9 U/L (ref 0–33)
ANION GAP SERPL CALCULATED.3IONS-SCNC: 10 MEQ/L (ref 9–15)
AST SERPL-CCNC: 14 U/L (ref 0–35)
BILIRUB SERPL-MCNC: 0.4 MG/DL (ref 0.2–0.7)
BUN SERPL-MCNC: 7 MG/DL (ref 6–20)
CALCIUM SERPL-MCNC: 8.4 MG/DL (ref 8.5–9.9)
CHLORIDE SERPL-SCNC: 102 MEQ/L (ref 95–107)
CO2 SERPL-SCNC: 23 MEQ/L (ref 20–31)
CREAT SERPL-MCNC: 0.56 MG/DL (ref 0.5–0.9)
ERYTHROCYTE [DISTWIDTH] IN BLOOD BY AUTOMATED COUNT: 15.3 % (ref 11.5–14.5)
GLOBULIN SER CALC-MCNC: 2.6 G/DL (ref 2.3–3.5)
GLUCOSE SERPL-MCNC: 95 MG/DL (ref 70–99)
HCT VFR BLD AUTO: 27.1 % (ref 37–47)
HGB BLD-MCNC: 8.9 G/DL (ref 12–16)
MCH RBC QN AUTO: 27.9 PG (ref 27–31.3)
MCHC RBC AUTO-ENTMCNC: 32.8 % (ref 33–37)
MCV RBC AUTO: 85 FL (ref 79.4–94.8)
PLATELET # BLD AUTO: 305 K/UL (ref 130–400)
POTASSIUM SERPL-SCNC: 4.3 MEQ/L (ref 3.4–4.9)
PROT SERPL-MCNC: 5.5 G/DL (ref 6.3–8)
RBC # BLD AUTO: 3.19 M/UL (ref 4.2–5.4)
SODIUM SERPL-SCNC: 135 MEQ/L (ref 135–144)
WBC # BLD AUTO: 15.1 K/UL (ref 4.5–11)

## 2025-03-25 PROCEDURE — 6370000000 HC RX 637 (ALT 250 FOR IP): Performed by: OBSTETRICS & GYNECOLOGY

## 2025-03-25 PROCEDURE — 2500000003 HC RX 250 WO HCPCS: Performed by: OBSTETRICS & GYNECOLOGY

## 2025-03-25 PROCEDURE — 1220000000 HC SEMI PRIVATE OB R&B

## 2025-03-25 PROCEDURE — 99024 POSTOP FOLLOW-UP VISIT: CPT | Performed by: OBSTETRICS & GYNECOLOGY

## 2025-03-25 PROCEDURE — 6360000002 HC RX W HCPCS: Performed by: OBSTETRICS & GYNECOLOGY

## 2025-03-25 PROCEDURE — 85027 COMPLETE CBC AUTOMATED: CPT

## 2025-03-25 PROCEDURE — 2580000003 HC RX 258: Performed by: OBSTETRICS & GYNECOLOGY

## 2025-03-25 PROCEDURE — 80053 COMPREHEN METABOLIC PANEL: CPT

## 2025-03-25 RX ADMIN — METHYLERGONOVINE MALEATE 200 MCG: 0.2 INJECTION INTRAVENOUS at 04:35

## 2025-03-25 RX ADMIN — CEFOXITIN 1000 MG: 1 INJECTION, POWDER, FOR SOLUTION INTRAVENOUS at 08:15

## 2025-03-25 RX ADMIN — DOCUSATE SODIUM 100 MG: 100 CAPSULE, LIQUID FILLED ORAL at 08:07

## 2025-03-25 RX ADMIN — ACETAMINOPHEN 1000 MG: 500 TABLET ORAL at 13:27

## 2025-03-25 RX ADMIN — KETOROLAC TROMETHAMINE 30 MG: 30 INJECTION, SOLUTION INTRAMUSCULAR at 03:03

## 2025-03-25 RX ADMIN — ACETAMINOPHEN 1000 MG: 500 TABLET ORAL at 21:21

## 2025-03-25 RX ADMIN — OXYCODONE 10 MG: 5 TABLET ORAL at 05:22

## 2025-03-25 RX ADMIN — KETOROLAC TROMETHAMINE 30 MG: 30 INJECTION, SOLUTION INTRAMUSCULAR at 08:05

## 2025-03-25 RX ADMIN — FAMOTIDINE 20 MG: 10 INJECTION, SOLUTION INTRAVENOUS at 08:02

## 2025-03-25 RX ADMIN — OXYCODONE 10 MG: 5 TABLET ORAL at 21:22

## 2025-03-25 RX ADMIN — FERROUS SULFATE TAB 325 MG (65 MG ELEMENTAL FE) 325 MG: 325 (65 FE) TAB at 08:08

## 2025-03-25 RX ADMIN — SIMETHICONE 80 MG: 80 TABLET, CHEWABLE ORAL at 08:15

## 2025-03-25 RX ADMIN — OXYCODONE 10 MG: 5 TABLET ORAL at 11:32

## 2025-03-25 RX ADMIN — IBUPROFEN 800 MG: 800 TABLET, FILM COATED ORAL at 16:14

## 2025-03-25 RX ADMIN — CEFOXITIN 1000 MG: 1 INJECTION, POWDER, FOR SOLUTION INTRAVENOUS at 02:06

## 2025-03-25 RX ADMIN — SODIUM CHLORIDE, PRESERVATIVE FREE 10 ML: 5 INJECTION INTRAVENOUS at 07:59

## 2025-03-25 RX ADMIN — ACETAMINOPHEN 1000 MG: 500 TABLET ORAL at 05:54

## 2025-03-25 RX ADMIN — DOCUSATE SODIUM 100 MG: 100 CAPSULE, LIQUID FILLED ORAL at 21:22

## 2025-03-25 RX ADMIN — FERROUS SULFATE TAB 325 MG (65 MG ELEMENTAL FE) 325 MG: 325 (65 FE) TAB at 16:14

## 2025-03-25 RX ADMIN — SIMETHICONE 80 MG: 80 TABLET, CHEWABLE ORAL at 22:14

## 2025-03-25 RX ADMIN — PRENATAL VIT W/ FE FUMARATE-FA TAB 27-0.8 MG 1 TABLET: 27-0.8 TAB at 08:07

## 2025-03-25 ASSESSMENT — PAIN DESCRIPTION - ORIENTATION
ORIENTATION: RIGHT;UPPER
ORIENTATION: LOWER
ORIENTATION: RIGHT
ORIENTATION: LOWER
ORIENTATION: MID;LOWER

## 2025-03-25 ASSESSMENT — PAIN DESCRIPTION - DESCRIPTORS
DESCRIPTORS: CRAMPING;BURNING
DESCRIPTORS: ACHING;CRAMPING
DESCRIPTORS: BURNING;ACHING
DESCRIPTORS: ACHING;CRAMPING;DISCOMFORT;BURNING

## 2025-03-25 ASSESSMENT — PAIN SCALES - GENERAL
PAINLEVEL_OUTOF10: 2
PAINLEVEL_OUTOF10: 10
PAINLEVEL_OUTOF10: 0
PAINLEVEL_OUTOF10: 2
PAINLEVEL_OUTOF10: 3
PAINLEVEL_OUTOF10: 7
PAINLEVEL_OUTOF10: 3
PAINLEVEL_OUTOF10: 10
PAINLEVEL_OUTOF10: 8
PAINLEVEL_OUTOF10: 0

## 2025-03-25 ASSESSMENT — PAIN DESCRIPTION - LOCATION
LOCATION: ABDOMEN;INCISION
LOCATION: ABDOMEN;INCISION
LOCATION: SHOULDER
LOCATION: SHOULDER
LOCATION: ABDOMEN
LOCATION: ABDOMEN;INCISION;SHOULDER

## 2025-03-25 NOTE — PROGRESS NOTES
Patient given and instructed on the use of an abdominal binder. Verbalized understanding, tolerated well.

## 2025-03-25 NOTE — PROGRESS NOTES
Patient has been complaining of right shoulder pain. She was medicated, physician has seen patient, patient encouraged to ambulate hallway.   Patient ambulating hallway pushing baby.  Significant other accompanying patient.Patient encouraged not to drink fluids with a straw, since in increases gas pains.  Verbalized understanding.

## 2025-03-25 NOTE — PROGRESS NOTES
Patient up to bathroom and called out for nurse due to large gush of blood. Nurse to room and patient on toilet with large amount of blood in toilet and on floor. Patient stated she felt pressure and thought she was urinating on herself and then got up to the bathroom and it was blood. No clots. Patient back to bed and unable to palpate fundus. Call placed to Dr Vega at 0430 and updated on patient status. Order obtained for Methergin 200 mcg IM and if bleeding continues to give TXA 1000mg IV. Methergin given at 0435. Misti pads changed. Fundus rechecked at 0450 and Firm at the Umbilicus. Misti pad weighed and 5mls of blood accounted for. Misti pads changed and fundus rechecked at 0515. Fundus firm at the Umbilicus and pad weighed and 15mls of blood accounted for. Will recheck bleeding and fundus at 0550.

## 2025-03-25 NOTE — PLAN OF CARE
Problem: Pain  Goal: Verbalizes/displays adequate comfort level or baseline comfort level  Recent Flowsheet Documentation  Taken 3/25/2025 0748 by Dolores Bee RN  Verbalizes/displays adequate comfort level or baseline comfort level:   Encourage patient to monitor pain and request assistance   Assess pain using appropriate pain scale   Administer analgesics based on type and severity of pain and evaluate response   Implement non-pharmacological measures as appropriate and evaluate response   Consider cultural and social influences on pain and pain management   Notify Licensed Independent Practitioner if interventions unsuccessful or patient reports new pain     Problem: Postpartum  Goal: Experiences normal postpartum course  Description:  Postpartum OB-Pregnancy care plan goal which identifies if the mother is experiencing a normal postpartum course  3/25/2025 0840 by Dolores Bee RN  Outcome: Progressing  3/25/2025 012 by Tamy Ballesteros RN  Outcome: Progressing  Goal: Appropriate maternal -  bonding  Description:  Postpartum OB-Pregnancy care plan goal which identifies if the mother and  are bonding appropriately  3/25/2025 0840 by Dolores Bee RN  Outcome: Progressing  3/25/2025 012 by Tamy Ballesteros RN  Outcome: Progressing  Goal: Establishment of infant feeding pattern  Description:  Postpartum OB-Pregnancy care plan goal which identifies if the mother is establishing a feeding pattern with their   3/25/2025 0840 by Dolores Bee RN  Outcome: Progressing  3/25/2025 0129 by Tamy Ballesteros RN  Outcome: Progressing  Goal: Incisions, wounds, or drain sites healing without S/S of infection  3/25/2025 0840 by Dolores Bee RN  Outcome: Progressing  3/25/2025 0129 by Tamy Ballesteros RN  Outcome: Progressing     Problem: Pain  Goal: Verbalizes/displays adequate comfort level or baseline comfort level  3/25/2025 0840 by Dolores Bee RN  Outcome:

## 2025-03-25 NOTE — PROGRESS NOTES
Postpartum Day-#1  Delivery    The patient feels well.. The patient denies any emotional concerns. Pain is well controlled with current medications. The baby is well and feeding via breast. Urinary output is adequate. The patient is ambulating well. The patient is tolerating a normal diet. Flatus has been passed. Lochia is less than menses.     Objective:        ROS:   Gen: Fatigue  CV: Negative  Lungs: Negative  Abdomen: Cramping  Pelvis: Lochia  Ex: 1+ Edema, No C/C  Rest of systems reviewed and found to be negative        Patient Vitals for the past 8 hrs:   BP Temp Temp src Pulse Resp SpO2   25 0749 -- -- -- -- -- 98 %   25 0748 107/60 98.3 °F (36.8 °C) Oral 70 18 98 %   25 0515 (!) 111/59 -- -- 58 18 99 %     General:   AxO x 3, in NAD   Bowel Sounds: +BS   Lochia:  Less than menses   Uterine Fundus:   Firm @ U   Incision:  C/D/I with dressing in place    DVT Evaluation:  No C/C/E       Results     Component Value Units   CBC [[id number]] (Abnormal)    Collected: 25    Updated: 25    Order Status: Completed    Specimen Type: Blood     WBC 15.1 High  K/uL    RBC 3.19 Low  M/uL    Hemoglobin 8.9 Low  g/dL    Hematocrit 27.1 Low  %    MCV 85.0 fL    MCH 27.9 pg    MCHC 32.8 Low  %    RDW 15.3 High  %    Platelets 305 K/uL   Comprehensive metabolic panel [[id number]] (Abnormal)    Collected: 25    Updated: 25    Order Status: Completed    Specimen Source: Blood     Sodium 135 mEq/L    Potassium 4.3 mEq/L    Chloride 102 mEq/L    CO2 23 mEq/L    Anion Gap 10 mEq/L    Glucose 95 mg/dL    BUN 7 mg/dL    Creatinine 0.56 mg/dL    Est, Glom Filt Rate >90.0    Comment: Pediatric calculator link  https://www.kidney.org/professionals/kdoqi/gfr_calculatorped  Effective Oct 3, 2022  These results are not intended for use in patients  <18 years of age.  eGFR results are calculated without  a race factor using the  CKD-EPI equation.  Careful  clinical

## 2025-03-25 NOTE — PROGRESS NOTES
Patient ambulating the hallway - pushing baby in crib accompanied by significant other.  Patient states she is feeling better.  The heating pad helped.  Currently has abdominal binder on.

## 2025-03-25 NOTE — PROGRESS NOTES
Patient calls nurse in regard to right upper shoulder.  States, \"It really hurts\" Patient has been seen by physician.  Medicated with scheduled Tylenol  Heating pad applied to shoulder area.  Tolerating well.

## 2025-03-25 NOTE — LACTATION NOTE
This note was copied from a baby's chart.  -baby at left breast in cross cradle hold upon entering room  -poor body alignment and shallow latch noted  -showed mom how to turn baby in towards her, belly to belly and pull in closer   -deeper latch with rhythmic sucking noted and mother reports increased comfort  -baby has + output and minimal weight loss  -offered support and encouragement  -recommend continue frequent skin to skin, breastfeeding on demand  -contact LC for assistance with feeds as needed  -continue to monitor diaper counts  -mom verbalized understanding of teaching  -will continue to follow    1200-follow up   -assisted in latching baby to right breast in football hold  -deep latch and rhythmic sucking, swallows audible  -mom denies pain with feed  -baby continues to have + output, transitional stool noted  -offered support and encouragement  -continue current feeding plan  -will continue to follow    1530-follow up  -mom resting in bed with eyes closed   -baby swaddled and asleep in crib  -report given to Shazia Cruz, IBCLC-will revisit pt tomorrow

## 2025-03-26 VITALS
HEART RATE: 69 BPM | BODY MASS INDEX: 42.78 KG/M2 | HEIGHT: 61 IN | OXYGEN SATURATION: 99 % | SYSTOLIC BLOOD PRESSURE: 100 MMHG | RESPIRATION RATE: 16 BRPM | TEMPERATURE: 98.3 F | WEIGHT: 226.6 LBS | DIASTOLIC BLOOD PRESSURE: 56 MMHG

## 2025-03-26 PROCEDURE — 99024 POSTOP FOLLOW-UP VISIT: CPT | Performed by: OBSTETRICS & GYNECOLOGY

## 2025-03-26 PROCEDURE — 6370000000 HC RX 637 (ALT 250 FOR IP): Performed by: OBSTETRICS & GYNECOLOGY

## 2025-03-26 RX ORDER — IBUPROFEN 800 MG/1
800 TABLET, FILM COATED ORAL EVERY 8 HOURS
Qty: 30 TABLET | Refills: 1 | Status: SHIPPED | OUTPATIENT
Start: 2025-03-26 | End: 2025-04-05

## 2025-03-26 RX ORDER — OXYCODONE HYDROCHLORIDE 5 MG/1
5 TABLET ORAL EVERY 6 HOURS PRN
Qty: 20 TABLET | Refills: 0 | Status: SHIPPED | OUTPATIENT
Start: 2025-03-26 | End: 2025-03-31

## 2025-03-26 RX ORDER — FERROUS SULFATE 325(65) MG
325 TABLET ORAL 2 TIMES DAILY WITH MEALS
Qty: 30 TABLET | Refills: 1 | Status: SHIPPED | OUTPATIENT
Start: 2025-03-26 | End: 2025-04-10

## 2025-03-26 RX ORDER — PSEUDOEPHEDRINE HCL 30 MG
100 TABLET ORAL
Qty: 30 CAPSULE | Refills: 1 | Status: SHIPPED | OUTPATIENT
Start: 2025-03-26 | End: 2025-04-25

## 2025-03-26 RX ADMIN — PRENATAL VIT W/ FE FUMARATE-FA TAB 27-0.8 MG 1 TABLET: 27-0.8 TAB at 08:14

## 2025-03-26 RX ADMIN — IBUPROFEN 800 MG: 800 TABLET, FILM COATED ORAL at 09:22

## 2025-03-26 RX ADMIN — FERROUS SULFATE TAB 325 MG (65 MG ELEMENTAL FE) 325 MG: 325 (65 FE) TAB at 08:14

## 2025-03-26 RX ADMIN — IBUPROFEN 800 MG: 800 TABLET, FILM COATED ORAL at 02:01

## 2025-03-26 RX ADMIN — DOCUSATE SODIUM 100 MG: 100 CAPSULE, LIQUID FILLED ORAL at 08:14

## 2025-03-26 RX ADMIN — OXYCODONE 10 MG: 5 TABLET ORAL at 01:22

## 2025-03-26 RX ADMIN — ACETAMINOPHEN 1000 MG: 500 TABLET ORAL at 06:03

## 2025-03-26 RX ADMIN — OXYCODONE 10 MG: 5 TABLET ORAL at 06:03

## 2025-03-26 ASSESSMENT — PAIN DESCRIPTION - DESCRIPTORS
DESCRIPTORS: CRAMPING;ACHING;DISCOMFORT
DESCRIPTORS: ACHING;DISCOMFORT;BURNING

## 2025-03-26 ASSESSMENT — PAIN DESCRIPTION - LOCATION
LOCATION: ABDOMEN;INCISION
LOCATION: ABDOMEN

## 2025-03-26 ASSESSMENT — PAIN DESCRIPTION - ORIENTATION: ORIENTATION: LOWER

## 2025-03-26 ASSESSMENT — PAIN SCALES - GENERAL
PAINLEVEL_OUTOF10: 2
PAINLEVEL_OUTOF10: 5
PAINLEVEL_OUTOF10: 7
PAINLEVEL_OUTOF10: 0
PAINLEVEL_OUTOF10: 7

## 2025-03-26 NOTE — DISCHARGE SUMMARY
1 medication(s) that are the same as other medications prescribed for you. Read the directions carefully, and ask your doctor or other care provider to review them with you.                CONTINUE taking these medications      PRENATAL GUMMIES PO            ASK your doctor about these medications      butalbital-acetaminophen-caffeine -40 MG per tablet  Commonly known as: FIORICET, ESGIC  Take 2 tablets by mouth every 6 hours as needed for Headaches Max Daily Amount: 8 tablets     escitalopram 10 MG tablet  Commonly known as: Lexapro  Take 1 tablet by mouth daily     * ferrous sulfate 325 (65 Fe) MG tablet  Commonly known as: IRON 325     * Iron 325 (65 Fe) MG Tabs  Take 1 tablet by mouth 2 times daily     NIFEdipine 10 MG immediate release capsule  Commonly known as: Procardia  Take 1 capsule by mouth 3 times daily     ondansetron 4 MG tablet  Commonly known as: Zofran  Take 1 tablet by mouth every 6 hours as needed for Nausea or Vomiting 1 tablet every 6 hrs prn for nausea and vomiting.     prochlorperazine 10 MG tablet  Commonly known as: COMPAZINE  Take 1 tablet by mouth every 6 hours as needed (nausea)           * This list has 2 medication(s) that are the same as other medications prescribed for you. Read the directions carefully, and ask your doctor or other care provider to review them with you.                   Where to Get Your Medications        These medications were sent to [facility] - [place], [place] - [address] - P [phone] - F [phone]  [address] [place] [address] [place] [address]      Phone: [phone]   docusate 100 MG Caps  ferrous sulfate 325 (65 Fe) MG tablet  ibuprofen 800 MG tablet  oxyCODONE 5 MG immediate release tablet         DIET: ADULT DIET; Regular    ACTIVITY: Pelvic Rest x 6 weeks   ______________________________________________________________________  COMPLEXITY OF FOLLOW UP:   [] Moderate Complexity: follow up within 7-14 calendar days (12580)   [x]

## 2025-03-26 NOTE — LACTATION NOTE
This note was copied from a baby's chart.   - mother states infant is latching well, does state she has some soreness and is using lansinoh, no damage noted. Encouraged to call this LC with next feeding to observe latch   - states she has a pump at home and is anticipating discharge today    1100 - mother states infant has spit up twice today, earlier this morning it was clear and recently it was curdly. Mother states her 1 yr old has acid reflux and started projectile vomiting during infancy. Reassured mom that some spit up is normal but to notify HCP if infant is spitting up after every feeding and or projectile vomiting. Encouraged support group or breastfeeding warm line to follow up with breastfeeding or any breastfeeding issues.

## 2025-03-26 NOTE — PROGRESS NOTES
CLINICAL PHARMACY NOTE: MEDS TO BEDS    Total # of Prescriptions Filled: 4   The following medications were delivered to the patient:  Oxycodone 5 mg Tab  Docusate 100 mg Cap  Ibuprofen 800 mg Tab  Iron 325 mg Tab    Additional Documentation:

## 2025-03-26 NOTE — PLAN OF CARE
Problem: Postpartum  Goal: Experiences normal postpartum course  Description:  Postpartum OB-Pregnancy care plan goal which identifies if the mother is experiencing a normal postpartum course  3/26/2025 0859 by Herminia Coto RN  Outcome: Completed  3/26/2025 0040 by Tamy Ballesteros RN  Outcome: Progressing  Goal: Appropriate maternal -  bonding  Description:  Postpartum OB-Pregnancy care plan goal which identifies if the mother and  are bonding appropriately  3/26/2025 0859 by Herminia Coto RN  Outcome: Completed  3/26/2025 0040 by Tamy Ballesteros RN  Outcome: Progressing  Goal: Establishment of infant feeding pattern  Description:  Postpartum OB-Pregnancy care plan goal which identifies if the mother is establishing a feeding pattern with their   3/26/2025 0859 by Herminia Coto RN  Outcome: Completed  3/26/2025 0040 by Tamy Ballesteros RN  Outcome: Progressing  Goal: Incisions, wounds, or drain sites healing without S/S of infection  3/26/2025 0859 by Herminia Coto RN  Outcome: Completed  3/26/2025 0040 by Tamy Ballesteros RN  Outcome: Progressing     Problem: Pain  Goal: Verbalizes/displays adequate comfort level or baseline comfort level  3/26/2025 0859 by Herminia Coto RN  Outcome: Completed  3/26/2025 0040 by Tamy Ballesteros RN  Outcome: Progressing     Problem: Infection - Adult  Goal: Absence of infection at discharge  3/26/2025 0859 by Herminia Coto RN  Outcome: Completed  3/26/2025 0040 by Tamy Ballesteros RN  Outcome: Progressing  Goal: Absence of infection during hospitalization  3/26/2025 0859 by Herminia Coto RN  Outcome: Completed  3/26/2025 0040 by Tamy Ballesteros RN  Outcome: Progressing  Goal: Absence of fever/infection during anticipated neutropenic period  3/26/2025 0859 by Herminia Coto RN  Outcome: Completed  3/26/2025 0040 by Tamy Ballesteros RN  Outcome: Progressing     Problem: Safety -

## 2025-03-26 NOTE — ANESTHESIA POSTPROCEDURE EVALUATION
Department of Anesthesiology  Postprocedure Note    Patient: Barbi Willson  MRN: [id number]  YOB: 2005  Date of evaluation: 3/26/2025    Procedure Summary       Date: 25 Room / Location: [address]    Anesthesia Start: 840 Anesthesia Stop: 935    Procedure:  SECTION Diagnosis:       Admitted to labor and delivery      (Admitted to labor and delivery [Z78.9])    Surgeons: Jelena Parikh MD Responsible Provider: Ellie Barnes APRN - CRNA    Anesthesia Type: spinal ASA Status: 2            Anesthesia Type: No value filed.    Eloina Phase I: Eloina Score: 9    Eloina Phase II:      Anesthesia Post Evaluation    Patient location during evaluation: bedside  Patient participation: complete - patient participated  Level of consciousness: awake and awake and alert  Pain score: 0  Airway patency: patent  Nausea & Vomiting: no nausea and no vomiting  Cardiovascular status: blood pressure returned to baseline and hemodynamically stable  Respiratory status: acceptable  Hydration status: euvolemic  Comments: Incisional burning pain, does not need medicine at this time, no anesthesia complications.   Pain management: adequate        No notable events documented.

## 2025-03-31 ENCOUNTER — OFFICE VISIT (OUTPATIENT)
Dept: OBGYN CLINIC | Age: 20
End: 2025-03-31

## 2025-03-31 VITALS
DIASTOLIC BLOOD PRESSURE: 60 MMHG | WEIGHT: 218 LBS | BODY MASS INDEX: 41.16 KG/M2 | SYSTOLIC BLOOD PRESSURE: 108 MMHG | HEIGHT: 61 IN | HEART RATE: 84 BPM

## 2025-03-31 DIAGNOSIS — N92.6 IRREGULAR PERIODS: ICD-10-CM

## 2025-03-31 DIAGNOSIS — Z09 POSTOPERATIVE EXAMINATION: Primary | ICD-10-CM

## 2025-03-31 RX ORDER — ETONOGESTREL 68 MG/1
68 IMPLANT SUBCUTANEOUS ONCE
Qty: 1 EACH | Refills: 0 | Status: SHIPPED
Start: 2025-03-31 | End: 2025-04-03

## 2025-04-03 RX ORDER — ETONOGESTREL 68 MG/1
IMPLANT SUBCUTANEOUS
Qty: 1 EACH | Refills: 0 | Status: SHIPPED | OUTPATIENT
Start: 2025-04-03

## 2025-04-04 NOTE — PROGRESS NOTES
Barbi Willson is a 19 y.o. female who presents here today for complaints of Postpartum Care (Incision check.  3/24/25.)        History of Present Illness  The patient presents for evaluation of postpartum status.    An improvement in her condition compared to the previous visit is reported. Significant increase in bruising is noted this time around.  following the delivery, uterine discomfort was experienced, which was alleviated through massage. A period of profuse bleeding post-delivery is mentioned. Currently breastfeeding, she expresses interest in having a contraceptive implant placed in her arm.    CONTRACEPTION:  Interest in contraceptive implant placement in the arm is expressed. Reproductive plans are uncertain.    OBSTETRICAL HISTORY:   2, Para 2. Delivered recently.         Vitals:  /60 (BP Site: Left Upper Arm, Patient Position: Sitting, BP Cuff Size: Large Adult)   Pulse 84   Ht 1.549 m (5' 1\")   Wt 98.9 kg (218 lb)   LMP 2024 (Approximate)   Breastfeeding Yes   BMI 41.19 kg/m²   Allergies:  Bactrim [sulfamethoxazole-trimethoprim] and Reglan [metoclopramide]  Past Medical History:   Diagnosis Date    History of migraine headaches      Past Surgical History:   Procedure Laterality Date     SECTION N/A 2022     SECTION performed by Jelena Parikh MD at [facility] OR     SECTION N/A 3/24/2025     SECTION performed by Jelena Parikh MD at [facility] OR    FEMUR FRACTURE SURGERY       OB History          2    Para   2    Term   2       0    AB   0    Living   2         SAB   0    IAB   0    Ectopic   0    Molar   0    Multiple   0    Live Births   2              Family History   Problem Relation Age of Onset    Hypertension Father     Hypertension Paternal Grandfather     Diabetes Paternal Grandmother     No Known Problems Maternal Grandmother     No Known Problems Maternal Grandfather     Lupus Mother     No Known

## 2025-04-07 ENCOUNTER — TELEPHONE (OUTPATIENT)
Dept: OBGYN CLINIC | Age: 20
End: 2025-04-07

## 2025-04-07 RX ORDER — NORGESTIMATE AND ETHINYL ESTRADIOL 0.25-0.035
1 KIT ORAL DAILY
Qty: 1 PACKET | Refills: 3 | Status: SHIPPED | OUTPATIENT
Start: 2025-04-07 | End: 2025-04-07 | Stop reason: SDUPTHER

## 2025-04-07 RX ORDER — NORGESTIMATE AND ETHINYL ESTRADIOL 0.25-0.035
1 KIT ORAL DAILY
Qty: 1 PACKET | Refills: 3 | Status: SHIPPED | OUTPATIENT
Start: 2025-04-07

## 2025-04-07 NOTE — TELEPHONE ENCOUNTER
Pt called stated they're 2 weeks postpartum. Had a csection. Started to bleed heavier than had been Saturday, passed a dime sized clot this morning. Asked if having any pain, stated they're not. Per nurse in office advised bleeding is normal. To be concerned if clots got bigger or had consistent heavy bleeding. Pt understood.

## 2025-04-11 DIAGNOSIS — N93.9 ABNORMAL UTERINE BLEEDING (AUB): Primary | ICD-10-CM

## 2025-04-15 ENCOUNTER — OFFICE VISIT (OUTPATIENT)
Dept: OBGYN CLINIC | Age: 20
End: 2025-04-15

## 2025-04-15 VITALS
DIASTOLIC BLOOD PRESSURE: 62 MMHG | SYSTOLIC BLOOD PRESSURE: 100 MMHG | BODY MASS INDEX: 39.27 KG/M2 | HEIGHT: 61 IN | HEART RATE: 84 BPM | WEIGHT: 208 LBS

## 2025-04-15 DIAGNOSIS — Z09 POSTOPERATIVE EXAMINATION: Primary | ICD-10-CM

## 2025-04-15 PROCEDURE — 0503F POSTPARTUM CARE VISIT: CPT | Performed by: OBSTETRICS & GYNECOLOGY

## 2025-04-23 NOTE — PROGRESS NOTES
Barbi Willson is a 20 y.o. female who presents here today for complaints of Postpartum Care ( 3/24/25. C/o continued VB. She states it's gotten lighter but some days are heavier than others. )        History of Present Illness  The patient presents for evaluation of postpartum bleeding and surgical incision. She is accompanied by her  baby.    Postpartum bleeding has decreased in volume, with smaller blood clots noted. Birth control pills have been prescribed, resulting in a reduction of bleeding. An ultrasound is scheduled for 2025. Exclusive breastfeeding is reported. Previous use of Nexplanon as a contraceptive method effectively suppressed the menstrual cycle.    The surgical incision is described as unsightly, with severe itching experienced yesterday. No topical treatments have been applied.    CONTRACEPTION:  - Type used: Birth control pills, Nexplanon  - History: Previous use of Nexplanon, currently on birth control pills    OBSTETRICAL HISTORY:  -  1, Para 1    PAST SURGICAL HISTORY:  -  section         Vitals:  /62 (BP Site: Left Upper Arm, Patient Position: Sitting, BP Cuff Size: Medium Adult)   Pulse 84   Ht 1.549 m (5' 1\")   Wt 94.3 kg (208 lb)   LMP 2024 (Approximate)   Breastfeeding Yes   BMI 39.30 kg/m²   Allergies:  Bactrim [sulfamethoxazole-trimethoprim] and Reglan [metoclopramide]  Past Medical History:   Diagnosis Date    History of migraine headaches      Past Surgical History:   Procedure Laterality Date     SECTION N/A 2022     SECTION performed by Jelena Parikh MD at [facility]     SECTION N/A 3/24/2025     SECTION performed by Jelena Parikh MD at [facility]    FEMUR FRACTURE SURGERY       OB History          2    Para   2    Term   2       0    AB   0    Living   2         SAB   0    IAB   0    Ectopic   0    Molar   0    Multiple   0    Live Births   2              Family

## 2025-05-13 ENCOUNTER — OFFICE VISIT (OUTPATIENT)
Dept: OBGYN CLINIC | Age: 20
End: 2025-05-13
Payer: COMMERCIAL

## 2025-05-13 VITALS
SYSTOLIC BLOOD PRESSURE: 100 MMHG | WEIGHT: 214 LBS | DIASTOLIC BLOOD PRESSURE: 62 MMHG | HEART RATE: 84 BPM | HEIGHT: 61 IN | BODY MASS INDEX: 40.4 KG/M2

## 2025-05-13 DIAGNOSIS — Z30.017 NEXPLANON INSERTION: ICD-10-CM

## 2025-05-13 DIAGNOSIS — N92.6 IRREGULAR MENSES: Primary | ICD-10-CM

## 2025-05-13 LAB
HCG, URINE, POC: NEGATIVE
Lab: NORMAL
NEGATIVE QC PASS/FAIL: NORMAL
POSITIVE QC PASS/FAIL: NORMAL

## 2025-05-13 PROCEDURE — 11981 INSERTION DRUG DLVR IMPLANT: CPT | Performed by: OBSTETRICS & GYNECOLOGY

## 2025-05-13 PROCEDURE — 81025 URINE PREGNANCY TEST: CPT | Performed by: OBSTETRICS & GYNECOLOGY

## 2025-05-21 RX ORDER — MICONAZOLE NITRATE 20 MG/G
CREAM TOPICAL
Qty: 42.5 G | Refills: 0 | Status: SHIPPED | OUTPATIENT
Start: 2025-05-21

## 2025-05-21 NOTE — PROGRESS NOTES
Barbi Willson is a 20 y.o. female who presents here today for complaints of Postpartum Care ( 3/24/25.) and Procedure (Nexplanon insertion.)        History of Present Illness  The patient presents for Nexplanon placement.    She is scheduled for the insertion of her second Nexplanon device, with the first one having been placed in the left arm. She reports a satisfactory experience with the initial device, noting an absence of menstrual periods during its use. She expresses a desire for a similar outcome with the new device. She mentions having had the previous device for almost a month.    CONTRACEPTION: Nexplanon, second device. She hopes to continue experiencing amenorrhea with the new device.         Vitals:  /62   Pulse 84   Ht 1.549 m (5' 1\")   Wt 97.1 kg (214 lb)   LMP 2024 (Approximate)   Breastfeeding Yes   BMI 40.43 kg/m²   Allergies:  Bactrim [sulfamethoxazole-trimethoprim] and Reglan [metoclopramide]  Past Medical History:   Diagnosis Date    History of migraine headaches      Past Surgical History:   Procedure Laterality Date     SECTION N/A 2022     SECTION performed by Jelena Parikh MD at [facility] OR     SECTION N/A 3/24/2025     SECTION performed by Jelena Parikh MD at [facility] OR    FEMUR FRACTURE SURGERY       OB History as of 2025          2    Para   2    Term   2       0    AB   0    Living   2         SAB   0    IAB   0    Ectopic   0    Molar   0    Multiple   0    Live Births   2              Family History   Problem Relation Age of Onset    Hypertension Father     Hypertension Paternal Grandfather     Diabetes Paternal Grandmother     No Known Problems Maternal Grandmother     No Known Problems Maternal Grandfather     Lupus Mother     No Known Problems Brother     Breast Cancer Neg Hx     Cancer Neg Hx     Colon Cancer Neg Hx     Ovarian Cancer Neg Hx      Labor Neg Hx     Spont Abortions

## 2025-05-21 NOTE — PROGRESS NOTES
Nexplanon Insertion Procedure Note    Pre-operative Diagnosis: irregular menses    Post-operative Diagnosis: irregular menses    Indications: same     Procedure Details   Urine pregnancy test was done  and result was negative.  The risks (including infection, bleeding, pain, and uterine perforation) and benefits of the procedure were explained to the patient and Written informed consent was obtained.      Nondominant arm , left arm, medial aspect the medial epicondyle was located , at 10 cm above the medial epicondyle a marker pen was used and 2 points in a straight line parallel to the long axis of the arm in the midline was chosec. I used ~ 6 cc of xylocaine 1% with epinephrine injected along the designated line. The Nexplanon applicator was then used , the large bore needle on the applicator harboring the nexaplanon implant was then tunneled through the skin subdermally along the line delineated earlier, once the whole needle was under the skin, the knob on the applicator was push back to a complete stop, ejecting the nexplanon into the subdermis where it was palpated and confirmed to be in the right location. Patient tolerated procedure well. Arm wrapped with compression gauze to prevent possible bruising.    Condition:  Stable    Complications:  None    Plan:    The patient was advised to call for any fever or for prolonged or severe pain or bleeding. She was advised to use OTC ibuprofen as needed for mild to moderate pain.     Follow up:  Return in about 4 weeks (around 4/12/2017) for check nexplanon.      Jelena Parikh MD

## 2025-06-30 ENCOUNTER — OFFICE VISIT (OUTPATIENT)
Dept: OBGYN CLINIC | Age: 20
End: 2025-06-30

## 2025-06-30 VITALS
WEIGHT: 217 LBS | SYSTOLIC BLOOD PRESSURE: 104 MMHG | BODY MASS INDEX: 40.97 KG/M2 | HEART RATE: 96 BPM | DIASTOLIC BLOOD PRESSURE: 70 MMHG | HEIGHT: 61 IN

## 2025-06-30 DIAGNOSIS — N94.10 DYSPAREUNIA IN FEMALE: Primary | ICD-10-CM

## 2025-07-10 ENCOUNTER — HOSPITAL ENCOUNTER (OUTPATIENT)
Dept: ULTRASOUND IMAGING | Age: 20
Discharge: HOME | End: 2025-07-12
Payer: MEDICAID

## 2025-07-10 ENCOUNTER — APPOINTMENT (OUTPATIENT)
Dept: ULTRASOUND IMAGING | Age: 20
End: 2025-07-10
Payer: MEDICAID

## 2025-07-10 DIAGNOSIS — N94.10 DYSPAREUNIA IN FEMALE: ICD-10-CM

## 2025-07-10 PROCEDURE — 76830 TRANSVAGINAL US NON-OB: CPT

## 2025-07-10 PROCEDURE — 76856 US EXAM PELVIC COMPLETE: CPT

## 2025-07-10 PROCEDURE — 93975 VASCULAR STUDY: CPT

## 2025-07-13 DIAGNOSIS — R10.2 PELVIC PAIN: Primary | ICD-10-CM

## 2025-08-14 ENCOUNTER — HOSPITAL ENCOUNTER (OUTPATIENT)
Dept: MRI IMAGING | Age: 20
Discharge: HOME | End: 2025-08-16
Payer: MEDICAID

## 2025-08-14 DIAGNOSIS — R10.2 PELVIC PAIN: ICD-10-CM

## 2025-08-14 PROCEDURE — 6360000004 HC RX CONTRAST MEDICATION: Performed by: OBSTETRICS & GYNECOLOGY

## 2025-08-14 PROCEDURE — A9577 INJ MULTIHANCE: HCPCS | Performed by: OBSTETRICS & GYNECOLOGY

## 2025-08-14 PROCEDURE — 72197 MRI PELVIS W/O & W/DYE: CPT

## 2025-08-14 RX ADMIN — GADOBENATE DIMEGLUMINE 20 ML: 529 INJECTION, SOLUTION INTRAVENOUS at 16:29

## 2025-09-02 ENCOUNTER — OFFICE VISIT (OUTPATIENT)
Dept: OBGYN CLINIC | Age: 20
End: 2025-09-02
Payer: MEDICAID

## 2025-09-02 VITALS
HEIGHT: 61 IN | WEIGHT: 225 LBS | HEART RATE: 88 BPM | BODY MASS INDEX: 42.48 KG/M2 | DIASTOLIC BLOOD PRESSURE: 68 MMHG | SYSTOLIC BLOOD PRESSURE: 112 MMHG

## 2025-09-02 DIAGNOSIS — R63.8 UNABLE TO LOSE WEIGHT: ICD-10-CM

## 2025-09-02 DIAGNOSIS — N94.10 DYSPAREUNIA IN FEMALE: Primary | ICD-10-CM

## 2025-09-02 DIAGNOSIS — Z71.89 COUNSELING REGARDING GOALS OF CARE: ICD-10-CM

## 2025-09-02 PROCEDURE — 99214 OFFICE O/P EST MOD 30 MIN: CPT | Performed by: OBSTETRICS & GYNECOLOGY

## (undated) DEVICE — SUTURE VICRYL SZ 0 L36IN ABSRB VLT L36MM CT-1 1/2 CIR J346H

## (undated) DEVICE — SUTURE VICRYL SZ 2-0 L27IN ABSRB VLT L36MM CT-1 1/2 CIR J339H

## (undated) DEVICE — STERILE NEOPRENE POWDER-FREE SURGICAL GLOVES WITH NITRILE COATING: Brand: PROTEXIS

## (undated) DEVICE — SUTURE VCRL SZ 2-0 L27IN ABSRB VLT L36MM CT-1 1/2 CIR J339H

## (undated) DEVICE — ASTOUND STANDARD SURGICAL GOWN, XL: Brand: CONVERTORS

## (undated) DEVICE — DRESSING TELFA STRL 3X6

## (undated) DEVICE — PAD,ABDOMINAL,8"X10",ST,LF: Brand: MEDLINE

## (undated) DEVICE — SUTURE VCRL SZ 0 L36IN ABSRB VLT L36MM CT-1 1/2 CIR J346H

## (undated) DEVICE — STAPLER SKIN SQ 30 ABSRB STPL DISP INSORB

## (undated) DEVICE — STAPLER SKIN SQ 30 ABSRB STPL DISP INSORB ORDER VIA PHONE OR EMAIL